# Patient Record
Sex: MALE | Race: WHITE | Employment: FULL TIME | URBAN - METROPOLITAN AREA
[De-identification: names, ages, dates, MRNs, and addresses within clinical notes are randomized per-mention and may not be internally consistent; named-entity substitution may affect disease eponyms.]

---

## 2024-01-31 PROCEDURE — 96375 TX/PRO/DX INJ NEW DRUG ADDON: CPT

## 2024-01-31 PROCEDURE — 96361 HYDRATE IV INFUSION ADD-ON: CPT

## 2024-01-31 PROCEDURE — 99285 EMERGENCY DEPT VISIT HI MDM: CPT

## 2024-01-31 PROCEDURE — 96365 THER/PROPH/DIAG IV INF INIT: CPT

## 2024-02-01 ENCOUNTER — APPOINTMENT (OUTPATIENT)
Dept: CT IMAGING | Age: 24
DRG: 373 | End: 2024-02-01
Payer: COMMERCIAL

## 2024-02-01 ENCOUNTER — HOSPITAL ENCOUNTER (INPATIENT)
Age: 24
LOS: 5 days | Discharge: HOME HEALTH | DRG: 373 | End: 2024-02-06
Attending: STUDENT IN AN ORGANIZED HEALTH CARE EDUCATION/TRAINING PROGRAM | Admitting: SURGERY
Payer: COMMERCIAL

## 2024-02-01 DIAGNOSIS — K35.32 PERFORATED APPENDICITIS: ICD-10-CM

## 2024-02-01 DIAGNOSIS — K35.32 ACUTE PERFORATED APPENDICITIS: Primary | ICD-10-CM

## 2024-02-01 LAB
ALBUMIN SERPL-MCNC: 4.7 G/DL (ref 3.5–5.2)
ALP SERPL-CCNC: 89 U/L (ref 40–129)
ALT SERPL-CCNC: 17 U/L (ref 0–40)
ANION GAP SERPL CALCULATED.3IONS-SCNC: 10 MMOL/L (ref 7–16)
ANION GAP SERPL CALCULATED.3IONS-SCNC: 11 MMOL/L (ref 7–16)
ANION GAP SERPL CALCULATED.3IONS-SCNC: 15 MMOL/L (ref 7–16)
AST SERPL-CCNC: 32 U/L (ref 0–39)
BASOPHILS # BLD: 0.03 K/UL (ref 0–0.2)
BASOPHILS # BLD: 0.03 K/UL (ref 0–0.2)
BASOPHILS NFR BLD: 0 % (ref 0–2)
BASOPHILS NFR BLD: 0 % (ref 0–2)
BILIRUB SERPL-MCNC: 1.1 MG/DL (ref 0–1.2)
BILIRUB UR QL STRIP: NEGATIVE
BUN SERPL-MCNC: 14 MG/DL (ref 6–20)
BUN SERPL-MCNC: 14 MG/DL (ref 6–20)
BUN SERPL-MCNC: 15 MG/DL (ref 6–20)
CALCIUM SERPL-MCNC: 8.6 MG/DL (ref 8.6–10.2)
CALCIUM SERPL-MCNC: 8.7 MG/DL (ref 8.6–10.2)
CALCIUM SERPL-MCNC: 9.3 MG/DL (ref 8.6–10.2)
CHLORIDE SERPL-SCNC: 100 MMOL/L (ref 98–107)
CHLORIDE SERPL-SCNC: 101 MMOL/L (ref 98–107)
CHLORIDE SERPL-SCNC: 98 MMOL/L (ref 98–107)
CLARITY UR: CLEAR
CO2 SERPL-SCNC: 23 MMOL/L (ref 22–29)
CO2 SERPL-SCNC: 26 MMOL/L (ref 22–29)
CO2 SERPL-SCNC: 26 MMOL/L (ref 22–29)
COLOR UR: YELLOW
CREAT SERPL-MCNC: 1 MG/DL (ref 0.7–1.2)
CREAT SERPL-MCNC: 1 MG/DL (ref 0.7–1.2)
CREAT SERPL-MCNC: 1.1 MG/DL (ref 0.7–1.2)
EOSINOPHIL # BLD: 0 K/UL (ref 0.05–0.5)
EOSINOPHIL # BLD: 0.03 K/UL (ref 0.05–0.5)
EOSINOPHILS RELATIVE PERCENT: 0 % (ref 0–6)
EOSINOPHILS RELATIVE PERCENT: 0 % (ref 0–6)
ERYTHROCYTE [DISTWIDTH] IN BLOOD BY AUTOMATED COUNT: 11.9 % (ref 11.5–15)
ERYTHROCYTE [DISTWIDTH] IN BLOOD BY AUTOMATED COUNT: 12.2 % (ref 11.5–15)
GFR SERPL CREATININE-BSD FRML MDRD: >60 ML/MIN/1.73M2
GLUCOSE SERPL-MCNC: 101 MG/DL (ref 74–99)
GLUCOSE SERPL-MCNC: 119 MG/DL (ref 74–99)
GLUCOSE SERPL-MCNC: 92 MG/DL (ref 74–99)
GLUCOSE UR STRIP-MCNC: NEGATIVE MG/DL
HCT VFR BLD AUTO: 44.9 % (ref 37–54)
HCT VFR BLD AUTO: 47.1 % (ref 37–54)
HGB BLD-MCNC: 15.5 G/DL (ref 12.5–16.5)
HGB BLD-MCNC: 17.1 G/DL (ref 12.5–16.5)
HGB UR QL STRIP.AUTO: ABNORMAL
IMM GRANULOCYTES # BLD AUTO: 0.04 K/UL (ref 0–0.58)
IMM GRANULOCYTES # BLD AUTO: 0.08 K/UL (ref 0–0.58)
IMM GRANULOCYTES NFR BLD: 0 % (ref 0–5)
IMM GRANULOCYTES NFR BLD: 1 % (ref 0–5)
KETONES UR STRIP-MCNC: 40 MG/DL
LACTATE BLDV-SCNC: 0.8 MMOL/L (ref 0.5–2.2)
LEUKOCYTE ESTERASE UR QL STRIP: NEGATIVE
LIPASE SERPL-CCNC: 27 U/L (ref 13–60)
LYMPHOCYTES NFR BLD: 0.78 K/UL (ref 1.5–4)
LYMPHOCYTES NFR BLD: 0.98 K/UL (ref 1.5–4)
LYMPHOCYTES RELATIVE PERCENT: 10 % (ref 20–42)
LYMPHOCYTES RELATIVE PERCENT: 5 % (ref 20–42)
MCH RBC QN AUTO: 31.2 PG (ref 26–35)
MCH RBC QN AUTO: 31.9 PG (ref 26–35)
MCHC RBC AUTO-ENTMCNC: 34.5 G/DL (ref 32–34.5)
MCHC RBC AUTO-ENTMCNC: 36.3 G/DL (ref 32–34.5)
MCV RBC AUTO: 87.9 FL (ref 80–99.9)
MCV RBC AUTO: 90.3 FL (ref 80–99.9)
MONOCYTES NFR BLD: 0.46 K/UL (ref 0.1–0.95)
MONOCYTES NFR BLD: 1.21 K/UL (ref 0.1–0.95)
MONOCYTES NFR BLD: 5 % (ref 2–12)
MONOCYTES NFR BLD: 8 % (ref 2–12)
NEUTROPHILS NFR BLD: 85 % (ref 43–80)
NEUTROPHILS NFR BLD: 86 % (ref 43–80)
NEUTS SEG NFR BLD: 12.61 K/UL (ref 1.8–7.3)
NEUTS SEG NFR BLD: 8.41 K/UL (ref 1.8–7.3)
NITRITE UR QL STRIP: NEGATIVE
PH UR STRIP: 6 [PH] (ref 5–9)
PLATELET # BLD AUTO: 155 K/UL (ref 130–450)
PLATELET # BLD AUTO: 185 K/UL (ref 130–450)
PMV BLD AUTO: 10 FL (ref 7–12)
PMV BLD AUTO: 10.4 FL (ref 7–12)
POTASSIUM SERPL-SCNC: 4 MMOL/L (ref 3.5–5)
POTASSIUM SERPL-SCNC: 4.1 MMOL/L (ref 3.5–5)
POTASSIUM SERPL-SCNC: 4.2 MMOL/L (ref 3.5–5)
PROT SERPL-MCNC: 7.9 G/DL (ref 6.4–8.3)
PROT UR STRIP-MCNC: ABNORMAL MG/DL
RBC # BLD AUTO: 4.97 M/UL (ref 3.8–5.8)
RBC # BLD AUTO: 5.36 M/UL (ref 3.8–5.8)
RBC #/AREA URNS HPF: ABNORMAL /HPF
SODIUM SERPL-SCNC: 136 MMOL/L (ref 132–146)
SODIUM SERPL-SCNC: 136 MMOL/L (ref 132–146)
SODIUM SERPL-SCNC: 138 MMOL/L (ref 132–146)
SP GR UR STRIP: 1.02 (ref 1–1.03)
UROBILINOGEN UR STRIP-ACNC: 0.2 EU/DL (ref 0–1)
WBC #/AREA URNS HPF: ABNORMAL /HPF
WBC OTHER # BLD: 10 K/UL (ref 4.5–11.5)
WBC OTHER # BLD: 14.7 K/UL (ref 4.5–11.5)

## 2024-02-01 PROCEDURE — 85025 COMPLETE CBC W/AUTO DIFF WBC: CPT

## 2024-02-01 PROCEDURE — 83690 ASSAY OF LIPASE: CPT

## 2024-02-01 PROCEDURE — 74177 CT ABD & PELVIS W/CONTRAST: CPT

## 2024-02-01 PROCEDURE — 6360000002 HC RX W HCPCS: Performed by: SURGERY

## 2024-02-01 PROCEDURE — 1200000000 HC SEMI PRIVATE

## 2024-02-01 PROCEDURE — 2580000003 HC RX 258: Performed by: STUDENT IN AN ORGANIZED HEALTH CARE EDUCATION/TRAINING PROGRAM

## 2024-02-01 PROCEDURE — 81001 URINALYSIS AUTO W/SCOPE: CPT

## 2024-02-01 PROCEDURE — 80053 COMPREHEN METABOLIC PANEL: CPT

## 2024-02-01 PROCEDURE — 6360000002 HC RX W HCPCS: Performed by: STUDENT IN AN ORGANIZED HEALTH CARE EDUCATION/TRAINING PROGRAM

## 2024-02-01 PROCEDURE — 2580000003 HC RX 258

## 2024-02-01 PROCEDURE — 6370000000 HC RX 637 (ALT 250 FOR IP): Performed by: STUDENT IN AN ORGANIZED HEALTH CARE EDUCATION/TRAINING PROGRAM

## 2024-02-01 PROCEDURE — 6360000004 HC RX CONTRAST MEDICATION: Performed by: RADIOLOGY

## 2024-02-01 PROCEDURE — 2580000003 HC RX 258: Performed by: SURGERY

## 2024-02-01 PROCEDURE — 83605 ASSAY OF LACTIC ACID: CPT

## 2024-02-01 PROCEDURE — 80048 BASIC METABOLIC PNL TOTAL CA: CPT

## 2024-02-01 RX ORDER — ENOXAPARIN SODIUM 100 MG/ML
40 INJECTION SUBCUTANEOUS DAILY
Status: DISCONTINUED | OUTPATIENT
Start: 2024-02-01 | End: 2024-02-06 | Stop reason: HOSPADM

## 2024-02-01 RX ORDER — OXYCODONE HYDROCHLORIDE 5 MG/1
10 TABLET ORAL EVERY 4 HOURS PRN
Status: DISCONTINUED | OUTPATIENT
Start: 2024-02-01 | End: 2024-02-06 | Stop reason: HOSPADM

## 2024-02-01 RX ORDER — METHOCARBAMOL 500 MG/1
1000 TABLET, FILM COATED ORAL 4 TIMES DAILY
Status: DISCONTINUED | OUTPATIENT
Start: 2024-02-01 | End: 2024-02-06 | Stop reason: HOSPADM

## 2024-02-01 RX ORDER — SODIUM CHLORIDE, SODIUM LACTATE, POTASSIUM CHLORIDE, AND CALCIUM CHLORIDE .6; .31; .03; .02 G/100ML; G/100ML; G/100ML; G/100ML
1000 INJECTION, SOLUTION INTRAVENOUS ONCE
Status: COMPLETED | OUTPATIENT
Start: 2024-02-01 | End: 2024-02-01

## 2024-02-01 RX ORDER — SODIUM CHLORIDE 9 MG/ML
INJECTION, SOLUTION INTRAVENOUS PRN
Status: DISCONTINUED | OUTPATIENT
Start: 2024-02-01 | End: 2024-02-06 | Stop reason: HOSPADM

## 2024-02-01 RX ORDER — 0.9 % SODIUM CHLORIDE 0.9 %
1000 INTRAVENOUS SOLUTION INTRAVENOUS ONCE
Status: COMPLETED | OUTPATIENT
Start: 2024-02-01 | End: 2024-02-01

## 2024-02-01 RX ORDER — KETOROLAC TROMETHAMINE 30 MG/ML
15 INJECTION, SOLUTION INTRAMUSCULAR; INTRAVENOUS ONCE
Status: COMPLETED | OUTPATIENT
Start: 2024-02-01 | End: 2024-02-01

## 2024-02-01 RX ORDER — ONDANSETRON 2 MG/ML
4 INJECTION INTRAMUSCULAR; INTRAVENOUS EVERY 6 HOURS PRN
Status: DISCONTINUED | OUTPATIENT
Start: 2024-02-01 | End: 2024-02-06 | Stop reason: HOSPADM

## 2024-02-01 RX ORDER — ONDANSETRON 4 MG/1
4 TABLET, ORALLY DISINTEGRATING ORAL EVERY 8 HOURS PRN
Status: DISCONTINUED | OUTPATIENT
Start: 2024-02-01 | End: 2024-02-06 | Stop reason: HOSPADM

## 2024-02-01 RX ORDER — SODIUM CHLORIDE 0.9 % (FLUSH) 0.9 %
5-40 SYRINGE (ML) INJECTION PRN
Status: DISCONTINUED | OUTPATIENT
Start: 2024-02-01 | End: 2024-02-06 | Stop reason: HOSPADM

## 2024-02-01 RX ORDER — ACETAMINOPHEN 325 MG/1
650 TABLET ORAL EVERY 6 HOURS
Status: DISCONTINUED | OUTPATIENT
Start: 2024-02-01 | End: 2024-02-06 | Stop reason: HOSPADM

## 2024-02-01 RX ORDER — SODIUM CHLORIDE 0.9 % (FLUSH) 0.9 %
5-40 SYRINGE (ML) INJECTION EVERY 12 HOURS SCHEDULED
Status: DISCONTINUED | OUTPATIENT
Start: 2024-02-01 | End: 2024-02-06 | Stop reason: HOSPADM

## 2024-02-01 RX ORDER — SODIUM CHLORIDE, SODIUM LACTATE, POTASSIUM CHLORIDE, CALCIUM CHLORIDE 600; 310; 30; 20 MG/100ML; MG/100ML; MG/100ML; MG/100ML
INJECTION, SOLUTION INTRAVENOUS CONTINUOUS
Status: DISCONTINUED | OUTPATIENT
Start: 2024-02-01 | End: 2024-02-03

## 2024-02-01 RX ORDER — FENTANYL CITRATE 50 UG/ML
50 INJECTION, SOLUTION INTRAMUSCULAR; INTRAVENOUS ONCE
Status: COMPLETED | OUTPATIENT
Start: 2024-02-01 | End: 2024-02-01

## 2024-02-01 RX ORDER — OXYCODONE HYDROCHLORIDE 5 MG/1
5 TABLET ORAL EVERY 4 HOURS PRN
Status: DISCONTINUED | OUTPATIENT
Start: 2024-02-01 | End: 2024-02-06 | Stop reason: HOSPADM

## 2024-02-01 RX ORDER — ONDANSETRON 2 MG/ML
4 INJECTION INTRAMUSCULAR; INTRAVENOUS ONCE
Status: COMPLETED | OUTPATIENT
Start: 2024-02-01 | End: 2024-02-01

## 2024-02-01 RX ADMIN — ENOXAPARIN SODIUM 40 MG: 100 INJECTION SUBCUTANEOUS at 09:45

## 2024-02-01 RX ADMIN — FENTANYL CITRATE 50 MCG: 50 INJECTION, SOLUTION INTRAMUSCULAR; INTRAVENOUS at 02:41

## 2024-02-01 RX ADMIN — METHOCARBAMOL TABLETS 1000 MG: 500 TABLET, COATED ORAL at 21:17

## 2024-02-01 RX ADMIN — OXYCODONE 5 MG: 5 TABLET ORAL at 09:43

## 2024-02-01 RX ADMIN — IOPAMIDOL 75 ML: 755 INJECTION, SOLUTION INTRAVENOUS at 01:57

## 2024-02-01 RX ADMIN — PIPERACILLIN SODIUM AND TAZOBACTAM SODIUM 4500 MG: 4; .5 INJECTION, POWDER, LYOPHILIZED, FOR SOLUTION INTRAVENOUS at 02:39

## 2024-02-01 RX ADMIN — METHOCARBAMOL TABLETS 1000 MG: 500 TABLET, COATED ORAL at 16:56

## 2024-02-01 RX ADMIN — ONDANSETRON 4 MG: 2 INJECTION INTRAMUSCULAR; INTRAVENOUS at 02:07

## 2024-02-01 RX ADMIN — METHOCARBAMOL TABLETS 1000 MG: 500 TABLET, COATED ORAL at 11:52

## 2024-02-01 RX ADMIN — ACETAMINOPHEN 650 MG: 325 TABLET ORAL at 11:52

## 2024-02-01 RX ADMIN — SODIUM CHLORIDE 1000 ML: 9 INJECTION, SOLUTION INTRAVENOUS at 00:56

## 2024-02-01 RX ADMIN — METHOCARBAMOL TABLETS 1000 MG: 500 TABLET, COATED ORAL at 09:43

## 2024-02-01 RX ADMIN — ACETAMINOPHEN 650 MG: 325 TABLET ORAL at 19:00

## 2024-02-01 RX ADMIN — AMPICILLIN SODIUM AND SULBACTAM SODIUM 3000 MG: 2; 1 INJECTION, POWDER, FOR SOLUTION INTRAMUSCULAR; INTRAVENOUS at 21:21

## 2024-02-01 RX ADMIN — OXYCODONE 5 MG: 5 TABLET ORAL at 16:56

## 2024-02-01 RX ADMIN — AMPICILLIN SODIUM AND SULBACTAM SODIUM 3000 MG: 2; 1 INJECTION, POWDER, FOR SOLUTION INTRAMUSCULAR; INTRAVENOUS at 13:48

## 2024-02-01 RX ADMIN — SODIUM CHLORIDE, POTASSIUM CHLORIDE, SODIUM LACTATE AND CALCIUM CHLORIDE 1000 ML: 600; 310; 30; 20 INJECTION, SOLUTION INTRAVENOUS at 05:09

## 2024-02-01 RX ADMIN — AMPICILLIN SODIUM AND SULBACTAM SODIUM 3000 MG: 2; 1 INJECTION, POWDER, FOR SOLUTION INTRAMUSCULAR; INTRAVENOUS at 07:57

## 2024-02-01 RX ADMIN — OXYCODONE 5 MG: 5 TABLET ORAL at 05:46

## 2024-02-01 RX ADMIN — SODIUM CHLORIDE, POTASSIUM CHLORIDE, SODIUM LACTATE AND CALCIUM CHLORIDE: 600; 310; 30; 20 INJECTION, SOLUTION INTRAVENOUS at 06:14

## 2024-02-01 RX ADMIN — ACETAMINOPHEN 650 MG: 325 TABLET ORAL at 06:16

## 2024-02-01 RX ADMIN — KETOROLAC TROMETHAMINE 15 MG: 30 INJECTION, SOLUTION INTRAMUSCULAR; INTRAVENOUS at 02:07

## 2024-02-01 ASSESSMENT — PAIN DESCRIPTION - ORIENTATION
ORIENTATION: RIGHT
ORIENTATION: LEFT;MID
ORIENTATION: RIGHT;LOWER
ORIENTATION: LOWER;RIGHT
ORIENTATION: RIGHT
ORIENTATION: RIGHT
ORIENTATION: LOWER;RIGHT

## 2024-02-01 ASSESSMENT — PAIN DESCRIPTION - LOCATION
LOCATION: ABDOMEN

## 2024-02-01 ASSESSMENT — PAIN SCALES - GENERAL
PAINLEVEL_OUTOF10: 6
PAINLEVEL_OUTOF10: 4
PAINLEVEL_OUTOF10: 6
PAINLEVEL_OUTOF10: 6
PAINLEVEL_OUTOF10: 1
PAINLEVEL_OUTOF10: 5
PAINLEVEL_OUTOF10: 0
PAINLEVEL_OUTOF10: 10
PAINLEVEL_OUTOF10: 2
PAINLEVEL_OUTOF10: 3
PAINLEVEL_OUTOF10: 0
PAINLEVEL_OUTOF10: 4
PAINLEVEL_OUTOF10: 3

## 2024-02-01 ASSESSMENT — PAIN DESCRIPTION - ONSET: ONSET: ON-GOING

## 2024-02-01 ASSESSMENT — PAIN DESCRIPTION - DESCRIPTORS
DESCRIPTORS: ACHING;DISCOMFORT;SORE
DESCRIPTORS: DULL;ACHING
DESCRIPTORS: DISCOMFORT;SORE
DESCRIPTORS: DISCOMFORT;ACHING
DESCRIPTORS: ACHING;DISCOMFORT;SORE;THROBBING
DESCRIPTORS: SPASM;THROBBING;TIGHTNESS
DESCRIPTORS: ACHING;DISCOMFORT;SORE
DESCRIPTORS: SHARP;STABBING

## 2024-02-01 ASSESSMENT — PAIN DESCRIPTION - PAIN TYPE
TYPE: ACUTE PAIN

## 2024-02-01 ASSESSMENT — PAIN - FUNCTIONAL ASSESSMENT
PAIN_FUNCTIONAL_ASSESSMENT: ACTIVITIES ARE NOT PREVENTED
PAIN_FUNCTIONAL_ASSESSMENT: 0-10
PAIN_FUNCTIONAL_ASSESSMENT: PREVENTS OR INTERFERES SOME ACTIVE ACTIVITIES AND ADLS
PAIN_FUNCTIONAL_ASSESSMENT: ACTIVITIES ARE NOT PREVENTED

## 2024-02-01 ASSESSMENT — LIFESTYLE VARIABLES: HOW OFTEN DO YOU HAVE A DRINK CONTAINING ALCOHOL: NEVER

## 2024-02-01 ASSESSMENT — PAIN DESCRIPTION - FREQUENCY: FREQUENCY: CONTINUOUS

## 2024-02-01 NOTE — ED NOTES
Department of Emergency Medicine  FIRST PROVIDER TRIAGE NOTE             Independent MLP           2/1/24  12:07 AM EST    Date of Encounter: 2/1/24   MRN: [id number]      HPI: Mark Malave is a 23 y.o. male who presents to the ED for Abdominal Pain (Mid abd pain that radiates down to RLQ x 2 days, symptoms began Tuesday morning.), Diarrhea, and Fever (100.8 F at home today )  Patient states that he began to have right lower quadrant abdominal pain which began 2 days ago which has been worsening.  History radiates to the bellybutton.  Patient did have hernia repair in December.  He had a fever 100.8 at home.  Also had diarrhea.  States he has had nausea but denies any vomiting    ROS: Negative for cp or sob.    PE: Gen Appearance/Constitutional: alert  CV: regular rate     Initial Plan of Care: All treatment areas with department are currently occupied. Plan to order/Initiate the following while awaiting opening in ED: labs and imaging studies.  Initiate Treatment-Testing, Proceed toTreatment Area When Bed Available for ED Attending/MLP to Continue Care    Electronically signed by GILLIAN Rodgers CNP   DD: 2/1/24       Abigail Mota APRN - CNP  02/01/24 0008

## 2024-02-01 NOTE — PLAN OF CARE
Problem: Discharge Planning  Goal: Discharge to home or other facility with appropriate resources  2/1/2024 1236 by Abigail Aguiar RN  Outcome: Progressing  2/1/2024 0426 by Licha Reyes RN  Outcome: Progressing  Flowsheets  Taken 2/1/2024 0420  Discharge to home or other facility with appropriate resources: Identify barriers to discharge with patient and caregiver  Taken 2/1/2024 0417  Discharge to home or other facility with appropriate resources: Identify barriers to discharge with patient and caregiver     Problem: Pain  Goal: Verbalizes/displays adequate comfort level or baseline comfort level  2/1/2024 1236 by Abigail Aguiar RN  Outcome: Progressing  2/1/2024 0426 by Licha Reyes RN  Outcome: Progressing     Problem: Safety - Adult  Goal: Free from fall injury  2/1/2024 1236 by Abigail Aguiar RN  Outcome: Progressing  2/1/2024 0426 by Licha Reyes RN  Outcome: Progressing     Problem: ABCDS Injury Assessment  Goal: Absence of physical injury  Outcome: Progressing

## 2024-02-01 NOTE — PLAN OF CARE
Problem: Discharge Planning  Goal: Discharge to home or other facility with appropriate resources  Outcome: Progressing  Flowsheets  Taken 2/1/2024 0420  Discharge to home or other facility with appropriate resources: Identify barriers to discharge with patient and caregiver  Taken 2/1/2024 041  Discharge to home or other facility with appropriate resources: Identify barriers to discharge with patient and caregiver     Problem: Pain  Goal: Verbalizes/displays adequate comfort level or baseline comfort level  Outcome: Progressing     Problem: Safety - Adult  Goal: Free from fall injury  Outcome: Progressing

## 2024-02-01 NOTE — ED NOTES
ED to Inpatient Handoff Report    Notified [facility] electronic handoff available and patient ready for transport to room 745.    Safety Risks: None identified    Patient in Restraints: no    Constant Observer or Patient : no    Telemetry Monitoring Ordered :yes           Order to transfer to unit without monitor:YES    Last MEWS:  Time completed: 0330    Deterioration Index Score:   Predictive Model Details          16 (Normal)  Factor Value    Calculated 2/1/2024 03:37 30% WBC count abnormal (14.7 k/uL)    Deterioration Index Model 27% Pulse 104     14% Age 23 years old     11% Potassium 4.2 mmol/L     6% Systolic 120     5% Sodium 136 mmol/L     5% Pulse oximetry 99 %     1% Hematocrit 47.1 %     1% Temperature 98.8 °F (37.1 °C)     0% Respiratory rate 16        Vitals:    02/01/24 0003 02/01/24 0241 02/01/24 0243 02/01/24 0330   BP: 118/77   120/73   Pulse: (!) 118  (!) 115 (!) 104   Resp: 18 16  16   Temp: 100 °F (37.8 °C)   98.8 °F (37.1 °C)   TempSrc: Oral   Oral   SpO2: 97%   99%   Weight: 79.4 kg (175 lb)      Height: 1.803 m (5' 11\")            Opportunity for questions and clarification was provided.

## 2024-02-01 NOTE — DISCHARGE SUMMARY
Physician Discharge Summary     Patient ID:  Sandeep Malave  [id number]  23 y.o.  2000    Admit date: 2/1/2024    Discharge date and time: 2/6/2024    Admitting Physician: Mark Salinas MD     Admission Diagnoses: Acute perforated appendicitis [K35.32]  Perforated appendicitis [K35.32]    Discharge Diagnoses: Principal Problem:    Perforated appendicitis  Resolved Problems:    * No resolved hospital problems. *      Admission Condition: fair    Discharged Condition: stable      Hospital Course:  Sandeep Malave is a 23 y.o. male who presented with right lower quadrant abdominal pain, nausea, and fever. Work up revealed perforated appendicitis with plans to treat conservatively. The patient's course was otherwise uneventful. He progressed well, pain was controlled on PO medications. He was tolerating a regular diet with no nausea or vomiting, and was in a suitable condition for discharge to home with PICC in place for IV abx in stable condition.      Consults:   IP CONSULT TO GENERAL SURGERY    Significant Diagnostic Studies:   CT ABDOMEN PELVIS W IV CONTRAST Additional Contrast? None    Result Date: 2/1/2024  EXAMINATION: CT OF THE ABDOMEN AND PELVIS WITH CONTRAST 2/1/2024 1:50 am TECHNIQUE: CT of the abdomen and pelvis was performed with the administration of intravenous contrast. Multiplanar reformatted images are provided for review. Automated exposure control, iterative reconstruction, and/or weight based adjustment of the mA/kV was utilized to reduce the radiation dose to as low as reasonably achievable. COMPARISON: None. HISTORY: ORDERING SYSTEM PROVIDED HISTORY: rlq ABDOMINAL PAIN TECHNOLOGIST PROVIDED HISTORY: Reason for exam:->rlq ABDOMINAL PAIN Additional Contrast?->None Decision Support Exception - unselect if not a suspected or confirmed emergency medical condition->Emergency Medical Condition (MA) FINDINGS: Lower Chest:  Visualized portion of the lower chest demonstrates no acute

## 2024-02-01 NOTE — PROGRESS NOTES
4 Eyes Skin Assessment     NAME:  Sandeep Malave  YOB: 2000  MEDICAL RECORD NUMBER:  [id number]    The patient is being assessed for  Admission    I agree that at least one RN has performed a thorough Head to Toe Skin Assessment on the patient. ALL assessment sites listed below have been assessed.      Areas assessed by both nurses:    Head, Face, Ears, Shoulders, Back, Chest, Arms, Elbows, Hands, Sacrum. Buttock, Coccyx, Ischium, and Legs. Feet and Heels        Does the Patient have a Wound? No noted wound(s)       Pavan Prevention initiated by RN: No  Wound Care Orders initiated by RN: No    Pressure Injury (Stage 3,4, Unstageable, DTI, NWPT, and Complex wounds) if present, place Wound referral order by RN under : No    New Ostomies, if present place, Ostomy referral order under : No     Nurse 1 eSignature: Electronically signed by Cydney Rivas RN on 2/1/24 at 8:01 AM EST    **SHARE this note so that the co-signing nurse can place an eSignature**    Nurse 2 eSignature: {Esignature:[id number]}

## 2024-02-01 NOTE — PROGRESS NOTES
P Quality Flow/Interdisciplinary Rounds Progress Note        Quality Flow Rounds held on February 1, 2024    Disciplines Attending:  Bedside Nurse, , , and Nursing Unit Leadership    Sandeep Malave was admitted on 2/1/2024  1:24 AM    Anticipated Discharge Date:       Disposition:    Pavan Score:  Pavan Scale Score: 21    Readmission Risk              Risk of Unplanned Readmission:  6           Discussed patient goal for the day, patient clinical progression, and barriers to discharge.  The following Goal(s) of the Day/Commitment(s) have been identified:   Discharge planning      Kaitlyn Celestin RN  February 1, 2024

## 2024-02-01 NOTE — H&P
GENERAL SURGERY  HISTORY AND PHYSICAL  2/1/2024    Chief Complaint   Patient presents with    Abdominal Pain     Mid abd pain that radiates down to RLQ x 2 days, symptoms began Tuesday morning.    Diarrhea    Fever     100.8 F at home today        HPI  Sandeep Malave is a 23 y.o. male who presents for evaluation of abdominal pain. Patient has had RLQ pain constant since Tuesday morning as well as diarrhea and some low grade fevers. Admits to nausea, but no emesis.     Hx notable for inguinal hernia repair by Dr. Montana 12/23.     Febrile, mildly tachycardic on arrival. WBC 14. CT A/P showing dilated appendix with appendicolith and adjacent fluid and extraluminal gas.     History reviewed. No pertinent past medical history.    Past Surgical History:   Procedure Laterality Date    HERNIA REPAIR         Prior to Admission medications    Not on File       Allergies   Allergen Reactions    Latex Hives       History reviewed. No pertinent family history.    Social History     Tobacco Use    Smoking status: Never    Smokeless tobacco: Never   Vaping Use    Vaping Use: Never used   Substance Use Topics    Alcohol use: Yes    Drug use: Never       Review of Systems: pertinent ROS listed in HPI, all others negative     PHYSICAL EXAM:    Vitals:    02/01/24 0243   BP:    Pulse: (!) 115   Resp:    Temp:    SpO2:        GENERAL:  NAD. A&Ox3.  HEAD:  Normocephalic, atraumatic.   EYES:   No scleral icterus. PERRLA.  LUNGS:  No increased work of breathing.  CARDIOVASCULAR: RR  ABDOMEN:  Soft, non-distended, mildly tender to palpation RLQ. No guarding, rigidity, rebound. Non-peritoneal  EXT: warm and well perfused       ASSESSMENT/PLAN:  23 y.o. male with perforated appendicitis. Will plan for conservative management as patient is not peritoneal and there is no drainable fluid collection right now.     NPO, IVF, IV abx  Will repeat CT AP in several days to reassess for drainable abscess    Plan will be discussed with

## 2024-02-01 NOTE — ED PROVIDER NOTES
[facility] MED SURG  EMERGENCY DEPARTMENT ENCOUNTER        Pt Name: Sandeep Malave  MRN: [id number]  Birthdate 2000  Date of evaluation: 1/31/2024  Provider: Danika Ellison DO  PCP: Beba Cooper MD  Note Started: 1:44 AM EST 2/1/24    CHIEF COMPLAINT       Chief Complaint   Patient presents with    Abdominal Pain     Mid abd pain that radiates down to RLQ x 2 days, symptoms began Tuesday morning.    Diarrhea    Fever     100.8 F at home today        HISTORY OF PRESENT ILLNESS: 1 or more Elements   History From: patient    Limitations to history : None    Sandeep Malave is a 23 y.o. male who presents to the emergency department complaining of abdominal pain.  Patient has been acting right lower quadrant abdominal pain over the past 2 days.  States that Tuesday morning he had pain in his periumbilical region and it has migrated down to his right lower quadrant.  He has had some nausea and episodes of diarrhea as well.  Patient states he also had a fever of 100.8 at home earlier today.  He was taking ibuprofen at home and his last dose was around 11 PM this evening.  He does have a history of a hernia repair in the past that was performed by Dr. Montana in December.  He states he does not have a history of any other prior abdominal surgeries.  Patient does not take any medications at home or have any medical problems in the past otherwise.  He denies any vomiting, chest pain, shortness of breath, dysuria, hematuria, recent hospitalization, recent illness, or other acute symptoms or concerns.    Nursing Notes were all reviewed and agreed with or any disagreements were addressed in the HPI.    REVIEW OF SYSTEMS :      Review of Systems    Positives and Pertinent negatives as per HPI.     SURGICAL HISTORY     Past Surgical History:   Procedure Laterality Date    HERNIA REPAIR         CURRENTMEDICATIONS       There are no discharge medications for this patient.      ALLERGIES    02/01/24 0546 02/01/24 0713   BP: 120/73 117/76  (!) 104/59   Pulse: (!) 104 (!) 119  (!) 114   Resp: 16 18 16 16   Temp: 98.8 °F (37.1 °C) (!) 100.5 °F (38.1 °C)  99.2 °F (37.3 °C)   TempSrc: Oral Oral  Oral   SpO2: 99% 98%  96%   Weight:       Height:           Patient was given the following medications:  Medications   sodium chloride flush 0.9 % injection 5-40 mL (has no administration in time range)   sodium chloride flush 0.9 % injection 5-40 mL (has no administration in time range)   0.9 % sodium chloride infusion (has no administration in time range)   ondansetron (ZOFRAN-ODT) disintegrating tablet 4 mg (has no administration in time range)     Or   ondansetron (ZOFRAN) injection 4 mg (has no administration in time range)   enoxaparin (LOVENOX) injection 40 mg (has no administration in time range)   lactated ringers IV soln infusion ( IntraVENous New Bag 2/1/24 0614)   acetaminophen (TYLENOL) tablet 650 mg (650 mg Oral Given 2/1/24 0616)   methocarbamol (ROBAXIN) tablet 1,000 mg (has no administration in time range)   oxyCODONE (ROXICODONE) immediate release tablet 5 mg (5 mg Oral Given 2/1/24 0546)     Or   oxyCODONE (ROXICODONE) immediate release tablet 10 mg ( Oral See Alternative 2/1/24 0546)   HYDROmorphone (DILAUDID) injection 0.5 mg (has no administration in time range)   ampicillin-sulbactam (UNASYN) 3,000 mg in sodium chloride 0.9 % 100 mL IVPB (3,000 mg IntraVENous New Bag 2/1/24 0757)   sodium chloride 0.9 % bolus 1,000 mL (0 mLs IntraVENous Stopped 2/1/24 0216)   iopamidol (ISOVUE-370) 76 % injection 75 mL (75 mLs IntraVENous Given 2/1/24 0157)   ondansetron (ZOFRAN) injection 4 mg (4 mg IntraVENous Given 2/1/24 0207)   ketorolac (TORADOL) injection 15 mg (15 mg IntraVENous Given 2/1/24 0207)   piperacillin-tazobactam (ZOSYN) 4,500 mg in sodium chloride 0.9 % 100 mL IVPB (vial-mate) (0 mg IntraVENous Stopped 2/1/24 0332)   fentaNYL (SUBLIMAZE) injection 50 mcg (50 mcg IntraVENous Given 2/1/24

## 2024-02-02 LAB
ANION GAP SERPL CALCULATED.3IONS-SCNC: 11 MMOL/L (ref 7–16)
BASOPHILS # BLD: 0.01 K/UL (ref 0–0.2)
BASOPHILS NFR BLD: 0 % (ref 0–2)
BUN SERPL-MCNC: 9 MG/DL (ref 6–20)
CALCIUM SERPL-MCNC: 8.1 MG/DL (ref 8.6–10.2)
CHLORIDE SERPL-SCNC: 100 MMOL/L (ref 98–107)
CO2 SERPL-SCNC: 24 MMOL/L (ref 22–29)
CREAT SERPL-MCNC: 0.8 MG/DL (ref 0.7–1.2)
EOSINOPHIL # BLD: 0.01 K/UL (ref 0.05–0.5)
EOSINOPHILS RELATIVE PERCENT: 0 % (ref 0–6)
ERYTHROCYTE [DISTWIDTH] IN BLOOD BY AUTOMATED COUNT: 11.9 % (ref 11.5–15)
GFR SERPL CREATININE-BSD FRML MDRD: >60 ML/MIN/1.73M2
GLUCOSE SERPL-MCNC: 116 MG/DL (ref 74–99)
HCT VFR BLD AUTO: 37 % (ref 37–54)
HGB BLD-MCNC: 13.2 G/DL (ref 12.5–16.5)
IMM GRANULOCYTES # BLD AUTO: 0.06 K/UL (ref 0–0.58)
IMM GRANULOCYTES NFR BLD: 1 % (ref 0–5)
LYMPHOCYTES NFR BLD: 0.78 K/UL (ref 1.5–4)
LYMPHOCYTES RELATIVE PERCENT: 8 % (ref 20–42)
MCH RBC QN AUTO: 31.6 PG (ref 26–35)
MCHC RBC AUTO-ENTMCNC: 35.7 G/DL (ref 32–34.5)
MCV RBC AUTO: 88.5 FL (ref 80–99.9)
MONOCYTES NFR BLD: 0.72 K/UL (ref 0.1–0.95)
MONOCYTES NFR BLD: 8 % (ref 2–12)
NEUTROPHILS NFR BLD: 83 % (ref 43–80)
NEUTS SEG NFR BLD: 7.86 K/UL (ref 1.8–7.3)
PLATELET, FLUORESCENCE: 141 K/UL (ref 130–450)
PMV BLD AUTO: 10.7 FL (ref 7–12)
POTASSIUM SERPL-SCNC: 3.8 MMOL/L (ref 3.5–5)
RBC # BLD AUTO: 4.18 M/UL (ref 3.8–5.8)
SODIUM SERPL-SCNC: 135 MMOL/L (ref 132–146)
WBC OTHER # BLD: 9.4 K/UL (ref 4.5–11.5)

## 2024-02-02 PROCEDURE — 85025 COMPLETE CBC W/AUTO DIFF WBC: CPT

## 2024-02-02 PROCEDURE — 6360000002 HC RX W HCPCS: Performed by: STUDENT IN AN ORGANIZED HEALTH CARE EDUCATION/TRAINING PROGRAM

## 2024-02-02 PROCEDURE — 1200000000 HC SEMI PRIVATE

## 2024-02-02 PROCEDURE — 87081 CULTURE SCREEN ONLY: CPT

## 2024-02-02 PROCEDURE — 80048 BASIC METABOLIC PNL TOTAL CA: CPT

## 2024-02-02 PROCEDURE — 6360000002 HC RX W HCPCS: Performed by: SURGERY

## 2024-02-02 PROCEDURE — 2580000003 HC RX 258: Performed by: STUDENT IN AN ORGANIZED HEALTH CARE EDUCATION/TRAINING PROGRAM

## 2024-02-02 PROCEDURE — 6370000000 HC RX 637 (ALT 250 FOR IP): Performed by: STUDENT IN AN ORGANIZED HEALTH CARE EDUCATION/TRAINING PROGRAM

## 2024-02-02 PROCEDURE — 2580000003 HC RX 258: Performed by: SURGERY

## 2024-02-02 RX ADMIN — ENOXAPARIN SODIUM 40 MG: 100 INJECTION SUBCUTANEOUS at 08:16

## 2024-02-02 RX ADMIN — ACETAMINOPHEN 650 MG: 325 TABLET ORAL at 01:09

## 2024-02-02 RX ADMIN — ACETAMINOPHEN 650 MG: 325 TABLET ORAL at 18:47

## 2024-02-02 RX ADMIN — ACETAMINOPHEN 650 MG: 325 TABLET ORAL at 06:24

## 2024-02-02 RX ADMIN — PIPERACILLIN AND TAZOBACTAM 4500 MG: 4; .5 INJECTION, POWDER, FOR SOLUTION INTRAVENOUS at 17:15

## 2024-02-02 RX ADMIN — AMPICILLIN SODIUM AND SULBACTAM SODIUM 3000 MG: 2; 1 INJECTION, POWDER, FOR SOLUTION INTRAMUSCULAR; INTRAVENOUS at 03:40

## 2024-02-02 RX ADMIN — ACETAMINOPHEN 650 MG: 325 TABLET ORAL at 12:37

## 2024-02-02 RX ADMIN — METHOCARBAMOL TABLETS 1000 MG: 500 TABLET, COATED ORAL at 08:16

## 2024-02-02 RX ADMIN — PIPERACILLIN AND TAZOBACTAM 4500 MG: 4; .5 INJECTION, POWDER, FOR SOLUTION INTRAVENOUS at 08:25

## 2024-02-02 ASSESSMENT — PAIN SCALES - GENERAL
PAINLEVEL_OUTOF10: 0
PAINLEVEL_OUTOF10: 3
PAINLEVEL_OUTOF10: 0
PAINLEVEL_OUTOF10: 2
PAINLEVEL_OUTOF10: 0
PAINLEVEL_OUTOF10: 3

## 2024-02-02 ASSESSMENT — PAIN DESCRIPTION - DESCRIPTORS
DESCRIPTORS: ACHING;SORE
DESCRIPTORS: ACHING;SORE;DISCOMFORT

## 2024-02-02 ASSESSMENT — PAIN DESCRIPTION - ORIENTATION
ORIENTATION: RIGHT;LOWER
ORIENTATION: RIGHT;LOWER

## 2024-02-02 ASSESSMENT — PAIN DESCRIPTION - LOCATION
LOCATION: ABDOMEN
LOCATION: ABDOMEN

## 2024-02-02 ASSESSMENT — PAIN - FUNCTIONAL ASSESSMENT
PAIN_FUNCTIONAL_ASSESSMENT: ACTIVITIES ARE NOT PREVENTED

## 2024-02-02 ASSESSMENT — PAIN DESCRIPTION - PAIN TYPE
TYPE: ACUTE PAIN
TYPE: ACUTE PAIN

## 2024-02-02 NOTE — PROGRESS NOTES
P Quality Flow/Interdisciplinary Rounds Progress Note        Quality Flow Rounds held on February 2, 2024    Disciplines Attending:  Bedside Nurse, , , and Nursing Unit Leadership    Sandeep Malave was admitted on 2/1/2024  1:24 AM    Anticipated Discharge Date:       Disposition:    Pavan Score:  Pavan Scale Score: 19    Readmission Risk              Risk of Unplanned Readmission:  9           Discussed patient goal for the day, patient clinical progression, and barriers to discharge.  The following Goal(s) of the Day/Commitment(s) have been identified:   Discharge planning      Kaitlyn Celestin RN  February 2, 2024

## 2024-02-02 NOTE — PROGRESS NOTES
GENERAL SURGERY  DAILY PROGRESS NOTE  2/2/2024    CHIEF COMPLAINT:  Chief Complaint   Patient presents with    Abdominal Pain     Mid abd pain that radiates down to RLQ x 2 days, symptoms began Tuesday morning.    Diarrhea    Fever     100.8 F at home today        SUBJECTIVE:  Patient febrile overnight. Abdominal pain improved overnight, passing gas. Had BM. Would like to stay with CLD, poor appetite.     OBJECTIVE:  /63   Pulse (!) 111   Temp 99.1 °F (37.3 °C) (Oral)   Resp 16   Ht 1.803 m (5' 11\")   Wt 79.4 kg (175 lb)   SpO2 92%   BMI 24.41 kg/m²     GENERAL:  NAD. A&Ox3.  LUNGS:  No increased work of breathing.  CARDIOVASCULAR: RR  ABDOMEN:  Soft, non-distended, non-tender. No guarding, rigidity, rebound.  EXT: warm and well perfused    ASSESSMENT/PLAN:  23 y.o. male with perforated appendicitis, planning for conservative management.     - continue CLD + mIVF  - continue IV antibiotics  - will plan for repeat CT A/P in a few days to reassess for drainable abscess    Discussed with Dr. Salinas.     Michelle Bright MD  Surgery Resident PGY-3  2/2/2024  4:55 AM    The patient was seen and examined and the chart was reviewed.  The patient had a perforated appendicitis with focal peritonitis.   Tmax 101.8  Slight tachycardia  WBCs 9.4  Physical examination:  The abdomen is soft, nondistended with appropriate focal tenderness involving the right lower quadrant.  The patient has mild guarding.  Assessment:  Perforated appendicitis with focal peritonitis  Plan:  Zosyn  Diet as tolerated  Mark Salinas MD

## 2024-02-02 NOTE — PROGRESS NOTES
went to see patient while rounding. Patient was sleeping.    prayed a silent prayer for the patient and left devotional material.  will attempt to follow up.

## 2024-02-02 NOTE — CARE COORDINATION
Met with patient and father, Brown about diagnosis and discharge plan of care. Pt admit for perforated appendicitis. Iv fluids, iv antibiotics. Clear liquid diet. Will repeat CT to eval abscess to see if needed drained. Max temp 101.8. pt lives with parents. Independent prior to admit. PCP is Dr Cooper. At this time, declining needs. Will continue to follow-mjo

## 2024-02-03 LAB
ANION GAP SERPL CALCULATED.3IONS-SCNC: 12 MMOL/L (ref 7–16)
BASOPHILS # BLD: 0.03 K/UL (ref 0–0.2)
BASOPHILS NFR BLD: 0 % (ref 0–2)
BUN SERPL-MCNC: 10 MG/DL (ref 6–20)
CALCIUM SERPL-MCNC: 8.8 MG/DL (ref 8.6–10.2)
CHLORIDE SERPL-SCNC: 100 MMOL/L (ref 98–107)
CO2 SERPL-SCNC: 23 MMOL/L (ref 22–29)
CREAT SERPL-MCNC: 0.9 MG/DL (ref 0.7–1.2)
EOSINOPHIL # BLD: 0.05 K/UL (ref 0.05–0.5)
EOSINOPHILS RELATIVE PERCENT: 1 % (ref 0–6)
ERYTHROCYTE [DISTWIDTH] IN BLOOD BY AUTOMATED COUNT: 11.9 % (ref 11.5–15)
GFR SERPL CREATININE-BSD FRML MDRD: >60 ML/MIN/1.73M2
GLUCOSE SERPL-MCNC: 88 MG/DL (ref 74–99)
HCT VFR BLD AUTO: 39.6 % (ref 37–54)
HGB BLD-MCNC: 13.9 G/DL (ref 12.5–16.5)
IMM GRANULOCYTES # BLD AUTO: 0.03 K/UL (ref 0–0.58)
IMM GRANULOCYTES NFR BLD: 0 % (ref 0–5)
LYMPHOCYTES NFR BLD: 1.21 K/UL (ref 1.5–4)
LYMPHOCYTES RELATIVE PERCENT: 12 % (ref 20–42)
MAGNESIUM SERPL-MCNC: 1.9 MG/DL (ref 1.6–2.6)
MCH RBC QN AUTO: 31.6 PG (ref 26–35)
MCHC RBC AUTO-ENTMCNC: 35.1 G/DL (ref 32–34.5)
MCV RBC AUTO: 90 FL (ref 80–99.9)
MICROORGANISM SPEC CULT: NORMAL
MONOCYTES NFR BLD: 0.81 K/UL (ref 0.1–0.95)
MONOCYTES NFR BLD: 8 % (ref 2–12)
NEUTROPHILS NFR BLD: 78 % (ref 43–80)
NEUTS SEG NFR BLD: 7.62 K/UL (ref 1.8–7.3)
PLATELET # BLD AUTO: 180 K/UL (ref 130–450)
PMV BLD AUTO: 11.2 FL (ref 7–12)
POTASSIUM SERPL-SCNC: 3.4 MMOL/L (ref 3.5–5)
RBC # BLD AUTO: 4.4 M/UL (ref 3.8–5.8)
SODIUM SERPL-SCNC: 135 MMOL/L (ref 132–146)
SPECIMEN DESCRIPTION: NORMAL
WBC OTHER # BLD: 9.8 K/UL (ref 4.5–11.5)

## 2024-02-03 PROCEDURE — 6360000002 HC RX W HCPCS: Performed by: STUDENT IN AN ORGANIZED HEALTH CARE EDUCATION/TRAINING PROGRAM

## 2024-02-03 PROCEDURE — 80048 BASIC METABOLIC PNL TOTAL CA: CPT

## 2024-02-03 PROCEDURE — 6370000000 HC RX 637 (ALT 250 FOR IP): Performed by: STUDENT IN AN ORGANIZED HEALTH CARE EDUCATION/TRAINING PROGRAM

## 2024-02-03 PROCEDURE — 2580000003 HC RX 258: Performed by: STUDENT IN AN ORGANIZED HEALTH CARE EDUCATION/TRAINING PROGRAM

## 2024-02-03 PROCEDURE — 87040 BLOOD CULTURE FOR BACTERIA: CPT

## 2024-02-03 PROCEDURE — 85025 COMPLETE CBC W/AUTO DIFF WBC: CPT

## 2024-02-03 PROCEDURE — 87324 CLOSTRIDIUM AG IA: CPT

## 2024-02-03 PROCEDURE — 1200000000 HC SEMI PRIVATE

## 2024-02-03 PROCEDURE — 87449 NOS EACH ORGANISM AG IA: CPT

## 2024-02-03 PROCEDURE — 83735 ASSAY OF MAGNESIUM: CPT

## 2024-02-03 RX ADMIN — PIPERACILLIN AND TAZOBACTAM 4500 MG: 4; .5 INJECTION, POWDER, FOR SOLUTION INTRAVENOUS at 01:47

## 2024-02-03 RX ADMIN — PIPERACILLIN AND TAZOBACTAM 4500 MG: 4; .5 INJECTION, POWDER, FOR SOLUTION INTRAVENOUS at 09:08

## 2024-02-03 RX ADMIN — SODIUM CHLORIDE, PRESERVATIVE FREE 10 ML: 5 INJECTION INTRAVENOUS at 21:39

## 2024-02-03 RX ADMIN — SODIUM CHLORIDE, PRESERVATIVE FREE 10 ML: 5 INJECTION INTRAVENOUS at 09:10

## 2024-02-03 RX ADMIN — ACETAMINOPHEN 650 MG: 325 TABLET ORAL at 09:09

## 2024-02-03 RX ADMIN — SODIUM CHLORIDE: 9 INJECTION, SOLUTION INTRAVENOUS at 23:52

## 2024-02-03 RX ADMIN — PIPERACILLIN AND TAZOBACTAM 4500 MG: 4; .5 INJECTION, POWDER, FOR SOLUTION INTRAVENOUS at 15:55

## 2024-02-03 RX ADMIN — ACETAMINOPHEN 650 MG: 325 TABLET ORAL at 01:48

## 2024-02-03 RX ADMIN — PIPERACILLIN AND TAZOBACTAM 4500 MG: 4; .5 INJECTION, POWDER, FOR SOLUTION INTRAVENOUS at 23:52

## 2024-02-03 RX ADMIN — ACETAMINOPHEN 650 MG: 325 TABLET ORAL at 21:37

## 2024-02-03 RX ADMIN — ACETAMINOPHEN 650 MG: 325 TABLET ORAL at 15:51

## 2024-02-03 ASSESSMENT — PAIN DESCRIPTION - ORIENTATION
ORIENTATION: RIGHT;LOWER
ORIENTATION: MID;LOWER
ORIENTATION: RIGHT;LOWER

## 2024-02-03 ASSESSMENT — PAIN SCALES - GENERAL
PAINLEVEL_OUTOF10: 0
PAINLEVEL_OUTOF10: 3
PAINLEVEL_OUTOF10: 1
PAINLEVEL_OUTOF10: 1

## 2024-02-03 ASSESSMENT — PAIN DESCRIPTION - LOCATION
LOCATION: ABDOMEN

## 2024-02-03 ASSESSMENT — PAIN DESCRIPTION - DESCRIPTORS
DESCRIPTORS: ACHING;DISCOMFORT
DESCRIPTORS: ACHING;DISCOMFORT
DESCRIPTORS: TENDER

## 2024-02-03 ASSESSMENT — PAIN - FUNCTIONAL ASSESSMENT: PAIN_FUNCTIONAL_ASSESSMENT: ACTIVITIES ARE NOT PREVENTED

## 2024-02-03 NOTE — PROGRESS NOTES
SPIRITUAL HEALTH SERVICES - [facility]  PROGRESS NOTE    Name: Sandeep Malave                Uatsdin: Mandaeism   Anointed (Last Rites): No    Referral: Routine Visit    Assessment:  Upon entering the room  observes patient calm and coping while laying in bed.  Patient was welcoming to the .      Intervention:   provided active listening, prayer, and nurtured hope.   left devotional material and information about  services.    Outcome:  Patient was encouraged, engaged in conversation, and expressed gratitude.    Plan:  Chaplains will remain available to offer spiritual and emotional support as needed.      Electronically signed by Chaplain Mik, on 2/2/2024 at 7:41 PM.  Spiritual Care Department  [facility]  [phone]

## 2024-02-03 NOTE — CONSULTS
rashes were noted. No jaundice.  HEENT: Eyes show round, and reactive pupils. Moist mucous membranes, no ulcerations, no thrush.   Neck: Supple to movements. No lymphadenopathy.   Chest: No use of accessory muscles to breathe. Symmetrical expansion. Auscultation reveals no wheezing, crackles, or rhonchi.   Cardiovascular: S1 and S2 are rhythmic and regular. No murmurs appreciated.   Abdomen: soft, distended. Right LQ tenderness. Lap scars healed well.  Extremities: No clubbing, no cyanosis, no edema.  Musculoskeletal: no gross focal abnormalities  Neurological: awake alert.  Lines: peripheral      CBC+dif:  Recent Labs     02/01/24  1057 02/02/24  0347 02/03/24  0513   WBC 10.0 9.4 9.8   HGB 15.5 13.2 13.9   HCT 44.9 37.0 39.6   MCV 90.3 88.5 90.0     --  180   NEUTROABS 8.41* 7.86* 7.62*     No results found for: \"CRP\"  No results found for: \"CRPHS\"  No results found for: \"SEDRATE\"  Lab Results   Component Value Date    ALT 17 02/01/2024    AST 32 02/01/2024    ALKPHOS 89 02/01/2024    BILITOT 1.1 02/01/2024     Lab Results   Component Value Date/Time     02/03/2024 05:13 AM    K 3.4 02/03/2024 05:13 AM     02/03/2024 05:13 AM    CO2 23 02/03/2024 05:13 AM    BUN 10 02/03/2024 05:13 AM    CREATININE 0.9 02/03/2024 05:13 AM    LABGLOM >60 02/03/2024 05:13 AM    GLUCOSE 88 02/03/2024 05:13 AM    PROT 7.9 02/01/2024 12:07 AM    LABALBU 4.7 02/01/2024 12:07 AM    CALCIUM 8.8 02/03/2024 05:13 AM    BILITOT 1.1 02/01/2024 12:07 AM    ALKPHOS 89 02/01/2024 12:07 AM    AST 32 02/01/2024 12:07 AM    ALT 17 02/01/2024 12:07 AM       No results found for: \"PROTIME\", \"INR\"    No results found for: \"TSH\"    Lab Results   Component Value Date/Time    COLORU Yellow 02/01/2024 12:07 AM    PHUR 6.0 02/01/2024 12:07 AM    WBCUA 0 TO 5 02/01/2024 12:07 AM    RBCUA 0 TO 2 02/01/2024 12:07 AM    SPECGRAV 1.020 02/01/2024 12:07 AM    LEUKOCYTESUR NEGATIVE 02/01/2024 12:07 AM    UROBILINOGEN 0.2 02/01/2024 12:07 AM     BILIRUBINUR NEGATIVE 02/01/2024 12:07 AM    GLUCOSEU NEGATIVE 02/01/2024 12:07 AM       No results found for: \"YAW9OGV\", \"BEART\", \"Y6VPKUOC\", \"PHART\", \"THGBART\", \"FXZ9ZGB\", \"PO2ART\", \"WOS3JTF\"  Radiology:  CT ABDOMEN PELVIS W IV CONTRAST Additional Contrast? None   Final Result   1. Findings consistent with perforated appendicitis. No drainable fluid   collection.   2. Diverticulosis.   3. Chronic bilateral L5 pars defects with grade 1 anterolisthesis of L5 on S1.   4. Small volume free fluid layering within the pelvis.   5. Mild wall thickening of the terminal ileum, likely reactive.             Microbiology:  Pending  No results for input(s): \"BC\" in the last 72 hours.  No results for input(s): \"ORG\" in the last 72 hours.  No results for input(s): \"BLOODCULT2\" in the last 72 hours.  No results for input(s): \"STREPNEUMAGU\" in the last 72 hours.  No results for input(s): \"LP1UAG\" in the last 72 hours.  No results for input(s): \"ASO\" in the last 72 hours.  No results for input(s): \"CULTRESP\" in the last 72 hours.    Assessment:  Perforated appendicitis:  Fever leukocytosis:  still has low grade fevers. White count normalized.    Plan:    Cont IV Zosyn 4.5 q8   Blood cx x 2.   If surgery is planning conservative management, will need IV antibiotics at discharge.   Dad at bed side. Spoke to him.  Monitor labs  Will follow with you    Thank you for having us see this patient in consultation. I will be discussing this case with the treating physicians.      Electronically signed by HUMBLE MELVIN MD on 2/3/2024 at 11:14 AM

## 2024-02-03 NOTE — PROGRESS NOTES
Progress note:  Overall, the patient feels better.  The patient is having flatus and bowel movements.  Afebrile  Tmax 100.4  Pulse rate 102  No recorded bowel movement  Laboratory studies pending  Physical examination:  The abdomen is soft, with slight distention, tympany and moderate right lower quadrant tenderness without guarding or rebound.  Assessment:  Acute appendicitis with perforation and local peritonitis  Slight ileus  Plan:  Continue Zosyn  Consult infectious disease  Possible repeat CT of the abdomen and pelvis tomorrow  Mark Salinas MD

## 2024-02-03 NOTE — PROGRESS NOTES
RN call out to Dr. Montoya (Dr. Arango on call ) re: new infectious disease consult re: Acute appendicitis with perforation and local peritonitis. Message left with answering service.

## 2024-02-04 LAB
ANION GAP SERPL CALCULATED.3IONS-SCNC: 15 MMOL/L (ref 7–16)
BASOPHILS # BLD: 0.03 K/UL (ref 0–0.2)
BASOPHILS NFR BLD: 0 % (ref 0–2)
BUN SERPL-MCNC: 11 MG/DL (ref 6–20)
C DIFF GDH + TOXINS A+B STL QL IA.RAPID: NEGATIVE
CALCIUM SERPL-MCNC: 8.9 MG/DL (ref 8.6–10.2)
CHLORIDE SERPL-SCNC: 101 MMOL/L (ref 98–107)
CO2 SERPL-SCNC: 21 MMOL/L (ref 22–29)
CREAT SERPL-MCNC: 0.9 MG/DL (ref 0.7–1.2)
EOSINOPHIL # BLD: 0.11 K/UL (ref 0.05–0.5)
EOSINOPHILS RELATIVE PERCENT: 1 % (ref 0–6)
ERYTHROCYTE [DISTWIDTH] IN BLOOD BY AUTOMATED COUNT: 12.1 % (ref 11.5–15)
GFR SERPL CREATININE-BSD FRML MDRD: >60 ML/MIN/1.73M2
GLUCOSE SERPL-MCNC: 84 MG/DL (ref 74–99)
HCT VFR BLD AUTO: 31.7 % (ref 37–54)
HGB BLD-MCNC: 11.1 G/DL (ref 12.5–16.5)
IMM GRANULOCYTES # BLD AUTO: 0.03 K/UL (ref 0–0.58)
IMM GRANULOCYTES NFR BLD: 0 % (ref 0–5)
LYMPHOCYTES NFR BLD: 1.36 K/UL (ref 1.5–4)
LYMPHOCYTES RELATIVE PERCENT: 15 % (ref 20–42)
MAGNESIUM SERPL-MCNC: 1.9 MG/DL (ref 1.6–2.6)
MCH RBC QN AUTO: 31.8 PG (ref 26–35)
MCHC RBC AUTO-ENTMCNC: 35 G/DL (ref 32–34.5)
MCV RBC AUTO: 90.8 FL (ref 80–99.9)
MONOCYTES NFR BLD: 0.67 K/UL (ref 0.1–0.95)
MONOCYTES NFR BLD: 8 % (ref 2–12)
NEUTROPHILS NFR BLD: 75 % (ref 43–80)
NEUTS SEG NFR BLD: 6.62 K/UL (ref 1.8–7.3)
PLATELET # BLD AUTO: 213 K/UL (ref 130–450)
PMV BLD AUTO: 10.3 FL (ref 7–12)
POTASSIUM SERPL-SCNC: 3.4 MMOL/L (ref 3.5–5)
RBC # BLD AUTO: 3.49 M/UL (ref 3.8–5.8)
SODIUM SERPL-SCNC: 137 MMOL/L (ref 132–146)
SPECIMEN DESCRIPTION: NORMAL
WBC OTHER # BLD: 8.8 K/UL (ref 4.5–11.5)

## 2024-02-04 PROCEDURE — 6360000002 HC RX W HCPCS: Performed by: STUDENT IN AN ORGANIZED HEALTH CARE EDUCATION/TRAINING PROGRAM

## 2024-02-04 PROCEDURE — 1200000000 HC SEMI PRIVATE

## 2024-02-04 PROCEDURE — 85025 COMPLETE CBC W/AUTO DIFF WBC: CPT

## 2024-02-04 PROCEDURE — 2580000003 HC RX 258: Performed by: STUDENT IN AN ORGANIZED HEALTH CARE EDUCATION/TRAINING PROGRAM

## 2024-02-04 PROCEDURE — 80048 BASIC METABOLIC PNL TOTAL CA: CPT

## 2024-02-04 PROCEDURE — 83735 ASSAY OF MAGNESIUM: CPT

## 2024-02-04 PROCEDURE — 6370000000 HC RX 637 (ALT 250 FOR IP): Performed by: STUDENT IN AN ORGANIZED HEALTH CARE EDUCATION/TRAINING PROGRAM

## 2024-02-04 PROCEDURE — 6370000000 HC RX 637 (ALT 250 FOR IP): Performed by: SURGERY

## 2024-02-04 RX ORDER — SODIUM CHLORIDE 9 MG/ML
INJECTION, SOLUTION INTRAVENOUS PRN
Status: DISCONTINUED | OUTPATIENT
Start: 2024-02-04 | End: 2024-02-06 | Stop reason: HOSPADM

## 2024-02-04 RX ORDER — POTASSIUM CHLORIDE 20 MEQ/1
20 TABLET, EXTENDED RELEASE ORAL ONCE
Status: COMPLETED | OUTPATIENT
Start: 2024-02-04 | End: 2024-02-04

## 2024-02-04 RX ORDER — HEPARIN 100 UNIT/ML
3 SYRINGE INTRAVENOUS PRN
Status: DISCONTINUED | OUTPATIENT
Start: 2024-02-04 | End: 2024-02-06 | Stop reason: HOSPADM

## 2024-02-04 RX ORDER — SODIUM CHLORIDE 0.9 % (FLUSH) 0.9 %
5-40 SYRINGE (ML) INJECTION EVERY 12 HOURS SCHEDULED
Status: DISCONTINUED | OUTPATIENT
Start: 2024-02-04 | End: 2024-02-06 | Stop reason: HOSPADM

## 2024-02-04 RX ORDER — HEPARIN 100 UNIT/ML
3 SYRINGE INTRAVENOUS EVERY 12 HOURS SCHEDULED
Status: DISCONTINUED | OUTPATIENT
Start: 2024-02-04 | End: 2024-02-06 | Stop reason: HOSPADM

## 2024-02-04 RX ORDER — SODIUM CHLORIDE 0.9 % (FLUSH) 0.9 %
5-40 SYRINGE (ML) INJECTION PRN
Status: DISCONTINUED | OUTPATIENT
Start: 2024-02-04 | End: 2024-02-06 | Stop reason: HOSPADM

## 2024-02-04 RX ORDER — LIDOCAINE HYDROCHLORIDE 10 MG/ML
5 INJECTION, SOLUTION EPIDURAL; INFILTRATION; INTRACAUDAL; PERINEURAL ONCE
Status: COMPLETED | OUTPATIENT
Start: 2024-02-04 | End: 2024-02-05

## 2024-02-04 RX ADMIN — SODIUM CHLORIDE, PRESERVATIVE FREE 10 ML: 5 INJECTION INTRAVENOUS at 10:26

## 2024-02-04 RX ADMIN — PIPERACILLIN AND TAZOBACTAM 4500 MG: 4; .5 INJECTION, POWDER, FOR SOLUTION INTRAVENOUS at 10:28

## 2024-02-04 RX ADMIN — ACETAMINOPHEN 650 MG: 325 TABLET ORAL at 17:17

## 2024-02-04 RX ADMIN — ACETAMINOPHEN 650 MG: 325 TABLET ORAL at 03:42

## 2024-02-04 RX ADMIN — PIPERACILLIN AND TAZOBACTAM 4500 MG: 4; .5 INJECTION, POWDER, FOR SOLUTION INTRAVENOUS at 17:14

## 2024-02-04 RX ADMIN — POTASSIUM CHLORIDE 20 MEQ: 1500 TABLET, EXTENDED RELEASE ORAL at 10:26

## 2024-02-04 RX ADMIN — ACETAMINOPHEN 650 MG: 325 TABLET ORAL at 10:25

## 2024-02-04 ASSESSMENT — PAIN DESCRIPTION - DESCRIPTORS
DESCRIPTORS: TENDER
DESCRIPTORS: ACHING;DISCOMFORT

## 2024-02-04 ASSESSMENT — PAIN DESCRIPTION - LOCATION
LOCATION: ABDOMEN
LOCATION: ABDOMEN

## 2024-02-04 ASSESSMENT — PAIN SCALES - GENERAL
PAINLEVEL_OUTOF10: 0
PAINLEVEL_OUTOF10: 2

## 2024-02-04 ASSESSMENT — PAIN DESCRIPTION - ORIENTATION
ORIENTATION: MID;RIGHT
ORIENTATION: RIGHT;LOWER

## 2024-02-04 ASSESSMENT — PAIN - FUNCTIONAL ASSESSMENT: PAIN_FUNCTIONAL_ASSESSMENT: ACTIVITIES ARE NOT PREVENTED

## 2024-02-04 NOTE — PROGRESS NOTES
Progress note:  Overall, the patient states that he feels better.  The patient had loose stools yesterday but are now more solid.  Afebrile   Pulse rate, respirations and blood pressure  Eight recorded bowel movements yesterday  Potassium 3.4  Physical examination:  The abdomen is soft, with mild distention and right lower quadrant tenderness without guarding or rebound  Assessment:  Perforated acute appendicitis with focal peritonitis  Diarrhea improved  Plan:  Continue Zosyn  Await stool culture results  Replace potassium  Appreciate infectious disease input.  Likely, the patient will have a PICC line placed and sent home on IV antibiotics.  Mark Salinas MD

## 2024-02-04 NOTE — PROGRESS NOTES
[facility]  [facility]  Progress Note    SUBJECTIVE:  Chief Complaint   Patient presents with    Abdominal Pain     Mid abd pain that radiates down to RLQ x 2 days, symptoms began Tuesday morning.    Diarrhea    Fever     100.8 F at home today      Patient is tolerating medications. No reported adverse drug reactions.  Says that he gets a burning feeling in his stomach when he is getting the antibiotic  No fevers - tmax 99.5   Stools are soft today - no longer having diarrhea      Review of systems:  As stated above in the chief complaint, otherwise negative.    Medications:  Scheduled Meds:   piperacillin-tazobactam  4,500 mg IntraVENous Q8H    sodium chloride flush  5-40 mL IntraVENous 2 times per day    enoxaparin  40 mg SubCUTAneous Daily    acetaminophen  650 mg Oral Q6H    methocarbamol  1,000 mg Oral 4x Daily     Continuous Infusions:   sodium chloride 12.5 mL/hr at 24 2352     PRN Meds:sodium chloride flush, sodium chloride, ondansetron **OR** ondansetron, oxyCODONE **OR** oxyCODONE, HYDROmorphone    OBJECTIVE:  /81   Pulse 94   Temp 97.7 °F (36.5 °C) (Oral)   Resp 18   Ht 1.803 m (5' 11\")   Wt 79.4 kg (175 lb)   SpO2 98%   BMI 24.41 kg/m²   Temp  Av.1 °F (36.7 °C)  Min: 97 °F (36.1 °C)  Max: 99.5 °F (37.5 °C)  Constitutional: The patient is awake, alert, and oriented. Sitting up in the chair, mom at bedside.   Skin: Warm and dry. No rashes were noted.   HEENT: Round and reactive pupils.  Moist mucous membranes.  No ulcerations or thrush.  Neck: Supple to movements.   Chest: No use of accessory muscles to breathe. Symmetrical expansion.  No wheezing, crackles or rhonchi.  Cardiovascular: S1 and S2 are rhythmic and regular. No murmurs appreciated.   Abdomen: Positive bowel sounds to auscultation. Minimal tenderness to palpation RLQ. No masses felt.   Extremities: No clubbing, no cyanosis, no edema.  Lines: Peripheral.    Laboratory, Tests, and  Consultants notes:  Reviewed     Radiology:  Reviewed     Microbiology:   Blood culture 2/3: negative so far  MRSA screening: negative    ASSESSMENT:  Perforated appendicitis:  Fever and leukocytosis: temps are trending down, tmax 99.5 last evening.     PLAN:  Continue Zosyn   Check final cultures - negative so far  Monitor labs  Surgery planning conservative management - will likely be going home with PICC and IV antibiotics.    GILLIAN Ross - CNP  10:40 AM  2/4/2024    Pt seen and examined. Above discussed agree with advanced practice nurse. Labs, cultures, and radiographs reviewed.  Face to Face encounter occurred. Changes made as necessary.   Spoke to mom at bed side. Ordered PICC. Will need to be arranged for IV antibiotics at home. Will do med rec tomorrow. Repeat CT in 2 weeks.       Louie Arango MD

## 2024-02-05 LAB
ANION GAP SERPL CALCULATED.3IONS-SCNC: 12 MMOL/L (ref 7–16)
ATYPICAL LYMPHOCYTE ABSOLUTE COUNT: 0.13 K/UL (ref 0–0.46)
ATYPICAL LYMPHOCYTES: 2 % (ref 0–4)
BASOPHILS # BLD: 0 K/UL (ref 0–0.2)
BASOPHILS NFR BLD: 0 % (ref 0–2)
BUN SERPL-MCNC: 8 MG/DL (ref 6–20)
CALCIUM SERPL-MCNC: 9.1 MG/DL (ref 8.6–10.2)
CHLORIDE SERPL-SCNC: 101 MMOL/L (ref 98–107)
CO2 SERPL-SCNC: 24 MMOL/L (ref 22–29)
CREAT SERPL-MCNC: 0.8 MG/DL (ref 0.7–1.2)
EOSINOPHIL # BLD: 0.2 K/UL (ref 0.05–0.5)
EOSINOPHILS RELATIVE PERCENT: 3 % (ref 0–6)
ERYTHROCYTE [DISTWIDTH] IN BLOOD BY AUTOMATED COUNT: 12 % (ref 11.5–15)
GFR SERPL CREATININE-BSD FRML MDRD: >60 ML/MIN/1.73M2
GLUCOSE SERPL-MCNC: 96 MG/DL (ref 74–99)
HCT VFR BLD AUTO: 39.4 % (ref 37–54)
HGB BLD-MCNC: 13.4 G/DL (ref 12.5–16.5)
LYMPHOCYTES NFR BLD: 0.6 K/UL (ref 1.5–4)
LYMPHOCYTES RELATIVE PERCENT: 8 % (ref 20–42)
MCH RBC QN AUTO: 31.2 PG (ref 26–35)
MCHC RBC AUTO-ENTMCNC: 34 G/DL (ref 32–34.5)
MCV RBC AUTO: 91.6 FL (ref 80–99.9)
MONOCYTES NFR BLD: 0.4 K/UL (ref 0.1–0.95)
MONOCYTES NFR BLD: 5 % (ref 2–12)
NEUTROPHILS NFR BLD: 83 % (ref 43–80)
NEUTS SEG NFR BLD: 6.27 K/UL (ref 1.8–7.3)
NUCLEATED RED BLOOD CELLS: 1 PER 100 WBC
PLATELET # BLD AUTO: 240 K/UL (ref 130–450)
PMV BLD AUTO: 10.3 FL (ref 7–12)
POTASSIUM SERPL-SCNC: 3.6 MMOL/L (ref 3.5–5)
RBC # BLD AUTO: 4.3 M/UL (ref 3.8–5.8)
RBC # BLD: ABNORMAL 10*6/UL
SODIUM SERPL-SCNC: 137 MMOL/L (ref 132–146)
WBC OTHER # BLD: 7.6 K/UL (ref 4.5–11.5)

## 2024-02-05 PROCEDURE — 6370000000 HC RX 637 (ALT 250 FOR IP): Performed by: STUDENT IN AN ORGANIZED HEALTH CARE EDUCATION/TRAINING PROGRAM

## 2024-02-05 PROCEDURE — 6360000002 HC RX W HCPCS: Performed by: STUDENT IN AN ORGANIZED HEALTH CARE EDUCATION/TRAINING PROGRAM

## 2024-02-05 PROCEDURE — 85025 COMPLETE CBC W/AUTO DIFF WBC: CPT

## 2024-02-05 PROCEDURE — 2580000003 HC RX 258: Performed by: STUDENT IN AN ORGANIZED HEALTH CARE EDUCATION/TRAINING PROGRAM

## 2024-02-05 PROCEDURE — 2500000003 HC RX 250 WO HCPCS: Performed by: REGISTERED NURSE

## 2024-02-05 PROCEDURE — 6360000002 HC RX W HCPCS: Performed by: REGISTERED NURSE

## 2024-02-05 PROCEDURE — C1751 CATH, INF, PER/CENT/MIDLINE: HCPCS

## 2024-02-05 PROCEDURE — 76937 US GUIDE VASCULAR ACCESS: CPT

## 2024-02-05 PROCEDURE — 2580000003 HC RX 258: Performed by: REGISTERED NURSE

## 2024-02-05 PROCEDURE — 1200000000 HC SEMI PRIVATE

## 2024-02-05 PROCEDURE — 02HV33Z INSERTION OF INFUSION DEVICE INTO SUPERIOR VENA CAVA, PERCUTANEOUS APPROACH: ICD-10-PCS | Performed by: SURGERY

## 2024-02-05 PROCEDURE — 80048 BASIC METABOLIC PNL TOTAL CA: CPT

## 2024-02-05 PROCEDURE — 36569 INSJ PICC 5 YR+ W/O IMAGING: CPT

## 2024-02-05 RX ORDER — POTASSIUM CHLORIDE 20 MEQ/1
40 TABLET, EXTENDED RELEASE ORAL ONCE
Status: COMPLETED | OUTPATIENT
Start: 2024-02-05 | End: 2024-02-05

## 2024-02-05 RX ADMIN — PIPERACILLIN AND TAZOBACTAM 4500 MG: 4; .5 INJECTION, POWDER, FOR SOLUTION INTRAVENOUS at 17:47

## 2024-02-05 RX ADMIN — PIPERACILLIN AND TAZOBACTAM 4500 MG: 4; .5 INJECTION, POWDER, FOR SOLUTION INTRAVENOUS at 08:43

## 2024-02-05 RX ADMIN — ACETAMINOPHEN 650 MG: 325 TABLET ORAL at 05:35

## 2024-02-05 RX ADMIN — SODIUM CHLORIDE, PRESERVATIVE FREE 10 ML: 5 INJECTION INTRAVENOUS at 08:44

## 2024-02-05 RX ADMIN — SODIUM CHLORIDE, PRESERVATIVE FREE 10 ML: 5 INJECTION INTRAVENOUS at 21:11

## 2024-02-05 RX ADMIN — POTASSIUM CHLORIDE 40 MEQ: 1500 TABLET, EXTENDED RELEASE ORAL at 11:55

## 2024-02-05 RX ADMIN — METHOCARBAMOL TABLETS 1000 MG: 500 TABLET, COATED ORAL at 21:12

## 2024-02-05 RX ADMIN — LIDOCAINE HYDROCHLORIDE 0.5 ML: 10 SOLUTION INTRAVENOUS at 17:35

## 2024-02-05 RX ADMIN — PIPERACILLIN AND TAZOBACTAM 4500 MG: 4; .5 INJECTION, POWDER, FOR SOLUTION INTRAVENOUS at 00:46

## 2024-02-05 RX ADMIN — ACETAMINOPHEN 650 MG: 325 TABLET ORAL at 00:45

## 2024-02-05 RX ADMIN — Medication 300 UNITS: at 21:11

## 2024-02-05 ASSESSMENT — PAIN - FUNCTIONAL ASSESSMENT
PAIN_FUNCTIONAL_ASSESSMENT: ACTIVITIES ARE NOT PREVENTED

## 2024-02-05 ASSESSMENT — PAIN DESCRIPTION - DESCRIPTORS
DESCRIPTORS: TENDER
DESCRIPTORS: CRAMPING
DESCRIPTORS: TENDER

## 2024-02-05 ASSESSMENT — PAIN DESCRIPTION - ORIENTATION
ORIENTATION: LOWER
ORIENTATION: MID;LOWER
ORIENTATION: MID;LOWER

## 2024-02-05 ASSESSMENT — PAIN DESCRIPTION - LOCATION
LOCATION: ABDOMEN

## 2024-02-05 ASSESSMENT — PAIN SCALES - GENERAL
PAINLEVEL_OUTOF10: 0
PAINLEVEL_OUTOF10: 4
PAINLEVEL_OUTOF10: 3
PAINLEVEL_OUTOF10: 0

## 2024-02-05 NOTE — PROCEDURES
PICC    Catheter insertion date: 2/5/2024     Product Number:  [id number]   Lot No: [id number]   Gauge: 17   Lumen: single   L Basilic    Vein Diameter: 0.53cm   Arm circumference at insertion site: 31cm   Catheter Length: 40cm   Internal Length: 40cm   External Catheter Length: 0cm   Ultrasound Used: yes  WILBERT Coreas confirms PICC tip is placed in the lower 1/3 of the SVC or at the Cavoatrial junction.  Floor nurse notified PICC is okay to use.   : RICKIE Figueroa RN [facility]

## 2024-02-05 NOTE — PROGRESS NOTES
GENERAL SURGERY  DAILY PROGRESS NOTE  2/5/2024    CHIEF COMPLAINT:  Chief Complaint   Patient presents with    Abdominal Pain     Mid abd pain that radiates down to RLQ x 2 days, symptoms began Tuesday morning.    Diarrhea    Fever     100.8 F at home today        SUBJECTIVE:  Repeat CT performed over the weekend with no drainable fluid collection. No nausea or vomiting. Having BM. Tolerating diet    OBJECTIVE:  /83   Pulse 87   Temp 97.6 °F (36.4 °C) (Oral)   Resp 18   Ht 1.803 m (5' 11\")   Wt 79.4 kg (175 lb)   SpO2 100%   BMI 24.41 kg/m²     GENERAL:  NAD. A&Ox3.  LUNGS:  No increased work of breathing.  CARDIOVASCULAR: RR  ABDOMEN:  Soft, non-distended, focally tender to palpation RLQ. No guarding, rigidity, rebound.  EXT: warm and well perfused, normal MO    ASSESSMENT/PLAN:  23 y.o. male with perforated appendicitis, planning for conservative management.     - continue regular diet  - continue IV antibiotics, appreciate ID assistance and management  -per ID recommending PICC for outpatient IV abx  - OOB, ambulate  - prn pain control  - prn anti-emetics    Discussed with Dr. Salinas.     Michelle Bright MD  Surgery Resident PGY-3  2/5/2024  4:57 AM    The patient was seen and examined and the chart was reviewed.  I agree with the assessment and plan.

## 2024-02-05 NOTE — PROGRESS NOTES
[facility]  [facility]  Progress Note    SUBJECTIVE:  Chief Complaint   Patient presents with    Abdominal Pain     Mid abd pain that radiates down to RLQ x 2 days, symptoms began Tuesday morning.    Diarrhea    Fever     100.8 F at home today      Patient is tolerating medications. No reported adverse drug reactions.  He is feeling a lot better. Able to walk around. Eating and have BM getting solid now.       Review of systems:  As stated above in the chief complaint, otherwise negative.    Medications:  Scheduled Meds:   lidocaine PF  5 mL IntraDERmal Once    sodium chloride flush  5-40 mL IntraVENous 2 times per day    heparin flush  3 mL IntraVENous 2 times per day    piperacillin-tazobactam  4,500 mg IntraVENous Q8H    sodium chloride flush  5-40 mL IntraVENous 2 times per day    enoxaparin  40 mg SubCUTAneous Daily    acetaminophen  650 mg Oral Q6H    methocarbamol  1,000 mg Oral 4x Daily     Continuous Infusions:   sodium chloride      sodium chloride 12.5 mL/hr at 24 2352     PRN Meds:sodium chloride flush, sodium chloride, heparin flush, sodium chloride flush, sodium chloride, ondansetron **OR** ondansetron, oxyCODONE **OR** oxyCODONE    OBJECTIVE:  /75   Pulse 83   Temp 98.4 °F (36.9 °C) (Oral)   Resp 18   Ht 1.803 m (5' 11\")   Wt 79.4 kg (175 lb)   SpO2 100%   BMI 24.41 kg/m²   Temp  Av.9 °F (36.6 °C)  Min: 97.6 °F (36.4 °C)  Max: 98.4 °F (36.9 °C)  Constitutional: The patient is awake, alert, and oriented. Sitting up in the chair, mom at bedside.   Skin: Warm and dry. No rashes were noted.   HEENT: Round and reactive pupils.  Moist mucous membranes.  No ulcerations or thrush.  Neck: Supple to movements.   Chest: No use of accessory muscles to breathe. Symmetrical expansion.  No wheezing, crackles or rhonchi.  Cardiovascular: S1 and S2 are rhythmic and regular. No murmurs appreciated.   Abdomen: Positive bowel sounds to auscultation. Minimal  tenderness to palpation RLQ. No masses felt.   Extremities: No clubbing, no cyanosis, no edema.  Lines: Peripheral.    Laboratory, Tests, and Consultants notes:  Reviewed     Radiology:  Reviewed     Microbiology:   Blood culture 2/3: negative so far  MRSA screening: negative    ASSESSMENT:  Perforated appendicitis:  Fever and leukocytosis:resolved    PLAN:  Continue Zosyn - plan for 2 weeks.   Pending PICC placement.   Repeat CT abdomen in 2 weeks.   Script in chart. Discharge instructions in place.   Monitor labs  Will follow with you    HUMBLE MELVIN MD  2:34 PM  2/5/2024

## 2024-02-05 NOTE — CARE COORDINATION
Updated plan of care. ID recommend IV antibiotics. Discussed with pt and son home care options. Referral called to [facility] and [facility]. Await final iv antibiotics to determine cost. Pt needs PICC line inserted. Will follow-mjo    ADDEND: updated [facility] and [facility] of possible discharge tomorrow. Script on chart. Cost for med per Iván is 0 copay. Still await PICC line insertion-mjo

## 2024-02-05 NOTE — PATIENT CARE CONFERENCE
P Quality Flow/Interdisciplinary Rounds Progress Note        Quality Flow Rounds held on February 5, 2024    Disciplines Attending:  Bedside Nurse, , , and Nursing Unit Leadership    Sandeep Malave was admitted on 2/1/2024  1:24 AM    Anticipated Discharge Date:       Disposition:    Pavan Score:  Pavan Scale Score: 20    Readmission Risk              Risk of Unplanned Readmission:  9           Discussed patient goal for the day, patient clinical progression, and barriers to discharge.  The following Goal(s) of the Day/Commitment(s) have been identified:  discharge plan is home with homecare for iv antibiotics. Will need picc line.       Cele Rosen RN  February 5, 2024

## 2024-02-05 NOTE — DISCHARGE INSTRUCTIONS
name  Route  Frequency  Start/Stop date      ROUTINE LABS TO BE DRAWN/ORDERED:  Twice weekly (preferably every Monday & Thursday):  BUN Creatinine and liver panel  Complete Blood Count with differential (CBC with dif)  Once weekly:  C-Reactive Protein (not high sensitivity CRP)  Erythrocyte/Westergren Sedimentation Rate (WSR or ESR)  Total CPK for patients on Daptomycin (Cubicin®). Obtain CPK more often if the patient is experiencing muscle weakness or myalgias.  Clinical Pharmacist is to adjust Vancomycin and Aminoglycosides unless otherwise indicated.  Draw Vancomycin trough 30 minutes before the third dose  After starting drug, or   After the dosing or interval is changed.  If the trough level is between 5 and 20 continue dose as ordered.  Draw troughs twice weekly thereafter until troughs are stable (i.e. until trough is between 5 and 20 mcg/ml for two consecutive laboratory values).  Once stable check troughs once weekly or every third dose.  Please do not call physician unless the trough is < 5 or >20.  If the trough is <20 continue dosing as ordered.  If the trough is >20 call the office for further orders.  Do not hold the dose while waiting for the trough result.  Amingoglycosides (e.g. Gentamicin, Tobramycin and Amikacin) peaks and troughs should be drawn twice weekly (preferably on Mondays and Thursdays) or every third dose.  Aminoglycoside peaks are not to be drawn if patient on Once-Daily Dosing (ODD).  Call physician or office if the trough is:  >1 for gentamicin,   >2 for tobramycin, or   >5 for amikacin  When clinically indicated obtain:  Urine culture. If the patient has a fever with purulent drainage from Pelletier or suprapubic catheter, or foul smelling urine. Do not irrigate a clogged Pelletier catheter. Replace it.  Blood cultures and Wound Gram stain with culture & sensitivity. If the patient has a fever or increasing drainage or foul odor from a wound. Notify the treating physician in a timely  MD Louie Arango MD

## 2024-02-06 VITALS
HEART RATE: 88 BPM | OXYGEN SATURATION: 99 % | DIASTOLIC BLOOD PRESSURE: 84 MMHG | HEIGHT: 71 IN | BODY MASS INDEX: 24.5 KG/M2 | WEIGHT: 175 LBS | TEMPERATURE: 97.7 F | RESPIRATION RATE: 17 BRPM | SYSTOLIC BLOOD PRESSURE: 140 MMHG

## 2024-02-06 LAB
ANION GAP SERPL CALCULATED.3IONS-SCNC: 12 MMOL/L (ref 7–16)
ATYPICAL LYMPHOCYTE ABSOLUTE COUNT: 0.49 K/UL (ref 0–0.46)
ATYPICAL LYMPHOCYTES: 6 % (ref 0–4)
BASOPHILS # BLD: 0 K/UL (ref 0–0.2)
BASOPHILS NFR BLD: 0 % (ref 0–2)
BUN SERPL-MCNC: 9 MG/DL (ref 6–20)
CALCIUM SERPL-MCNC: 9 MG/DL (ref 8.6–10.2)
CHLORIDE SERPL-SCNC: 106 MMOL/L (ref 98–107)
CO2 SERPL-SCNC: 21 MMOL/L (ref 22–29)
CREAT SERPL-MCNC: 0.8 MG/DL (ref 0.7–1.2)
EOSINOPHIL # BLD: 0.07 K/UL (ref 0.05–0.5)
EOSINOPHILS RELATIVE PERCENT: 1 % (ref 0–6)
ERYTHROCYTE [DISTWIDTH] IN BLOOD BY AUTOMATED COUNT: 12 % (ref 11.5–15)
GFR SERPL CREATININE-BSD FRML MDRD: >60 ML/MIN/1.73M2
GLUCOSE SERPL-MCNC: 82 MG/DL (ref 74–99)
HCT VFR BLD AUTO: 40 % (ref 37–54)
HGB BLD-MCNC: 13.6 G/DL (ref 12.5–16.5)
LYMPHOCYTES NFR BLD: 1.55 K/UL (ref 1.5–4)
LYMPHOCYTES RELATIVE PERCENT: 19 % (ref 20–42)
MCH RBC QN AUTO: 31.1 PG (ref 26–35)
MCHC RBC AUTO-ENTMCNC: 34 G/DL (ref 32–34.5)
MCV RBC AUTO: 91.5 FL (ref 80–99.9)
MONOCYTES NFR BLD: 0.63 K/UL (ref 0.1–0.95)
MONOCYTES NFR BLD: 8 % (ref 2–12)
MYELOCYTES ABSOLUTE COUNT: 0.07 K/UL
MYELOCYTES: 1 %
NEUTROPHILS NFR BLD: 65 % (ref 43–80)
NEUTS SEG NFR BLD: 5.28 K/UL (ref 1.8–7.3)
PLATELET # BLD AUTO: 235 K/UL (ref 130–450)
PMV BLD AUTO: 9.8 FL (ref 7–12)
POTASSIUM SERPL-SCNC: 4.2 MMOL/L (ref 3.5–5)
RBC # BLD AUTO: 4.37 M/UL (ref 3.8–5.8)
RBC # BLD: NORMAL 10*6/UL
SODIUM SERPL-SCNC: 139 MMOL/L (ref 132–146)
WBC OTHER # BLD: 8.1 K/UL (ref 4.5–11.5)

## 2024-02-06 PROCEDURE — 36415 COLL VENOUS BLD VENIPUNCTURE: CPT

## 2024-02-06 PROCEDURE — 6360000002 HC RX W HCPCS: Performed by: STUDENT IN AN ORGANIZED HEALTH CARE EDUCATION/TRAINING PROGRAM

## 2024-02-06 PROCEDURE — 6370000000 HC RX 637 (ALT 250 FOR IP): Performed by: STUDENT IN AN ORGANIZED HEALTH CARE EDUCATION/TRAINING PROGRAM

## 2024-02-06 PROCEDURE — 85025 COMPLETE CBC W/AUTO DIFF WBC: CPT

## 2024-02-06 PROCEDURE — 2580000003 HC RX 258: Performed by: STUDENT IN AN ORGANIZED HEALTH CARE EDUCATION/TRAINING PROGRAM

## 2024-02-06 PROCEDURE — 80048 BASIC METABOLIC PNL TOTAL CA: CPT

## 2024-02-06 RX ORDER — OXYCODONE HYDROCHLORIDE AND ACETAMINOPHEN 5; 325 MG/1; MG/1
1 TABLET ORAL EVERY 6 HOURS PRN
Qty: 20 TABLET | Refills: 0 | Status: SHIPPED | OUTPATIENT
Start: 2024-02-06 | End: 2024-02-11

## 2024-02-06 RX ADMIN — ACETAMINOPHEN 650 MG: 325 TABLET ORAL at 01:51

## 2024-02-06 RX ADMIN — PIPERACILLIN AND TAZOBACTAM 4500 MG: 4; .5 INJECTION, POWDER, FOR SOLUTION INTRAVENOUS at 17:33

## 2024-02-06 RX ADMIN — PIPERACILLIN AND TAZOBACTAM 4500 MG: 4; .5 INJECTION, POWDER, FOR SOLUTION INTRAVENOUS at 10:13

## 2024-02-06 RX ADMIN — PIPERACILLIN AND TAZOBACTAM 4500 MG: 4; .5 INJECTION, POWDER, FOR SOLUTION INTRAVENOUS at 01:56

## 2024-02-06 ASSESSMENT — PAIN - FUNCTIONAL ASSESSMENT: PAIN_FUNCTIONAL_ASSESSMENT: ACTIVITIES ARE NOT PREVENTED

## 2024-02-06 ASSESSMENT — PAIN SCALES - GENERAL: PAINLEVEL_OUTOF10: 4

## 2024-02-06 ASSESSMENT — PAIN DESCRIPTION - DESCRIPTORS: DESCRIPTORS: CRAMPING

## 2024-02-06 ASSESSMENT — PAIN DESCRIPTION - LOCATION: LOCATION: ABDOMEN

## 2024-02-06 ASSESSMENT — PAIN DESCRIPTION - ORIENTATION: ORIENTATION: LOWER

## 2024-02-06 NOTE — PROGRESS NOTES
Pt stated that he does not feel that he needs pain medication at this time. Pt and father aware that script is good for 14 days should they feel it's needed after discharge.

## 2024-02-06 NOTE — PROGRESS NOTES
Okay to give 6pm dose of Zosyn over 1 hour and discharge patient home per Dr. Arango as long as home health care comes in the am to see patient.

## 2024-02-06 NOTE — FLOWSHEET NOTE
02/06/24 1830   AVS Reviewed   AVS & discharge instructions reviewed with patient and/or representative? Yes   Reviewed instructions with Patient   Level of Understanding Questions answered;Teach back completed;Verbalized understanding     Education Documentation  Home Health Care Services, taught by Corry Levi RN at 2/6/2024  6:29 PM.  Learner: Family, Patient  Readiness: Acceptance  Method: Explanation  Response: Verbalizes Understanding    Follow-up Appointments, taught by Corry Levi RN at 2/6/2024  6:29 PM.  Learner: Family, Patient  Readiness: Acceptance  Method: Explanation  Response: Verbalizes Understanding    When to Call the Doctor, taught by Corry Levi RN at 2/6/2024  6:29 PM.  Learner: Family, Patient  Readiness: Acceptance  Method: Explanation  Response: Verbalizes Understanding    Review Discharge Plan, taught by Corry Levi RN at 2/6/2024  6:29 PM.  Learner: Family, Patient  Readiness: Acceptance  Method: Explanation  Response: Verbalizes Understanding    Heparin Sod (Pork) Lock Flush, taught by Corry Levi RN at 2/6/2024  6:29 PM.  Learner: Family, Patient  Readiness: Acceptance  Method: Explanation  Response: Verbalizes Understanding    Lidocaine HCl, taught by Corry Levi RN at 2/6/2024  6:29 PM.  Learner: Family, Patient  Readiness: Acceptance  Method: Explanation  Response: Verbalizes Understanding    Potassium Chloride [facility], taught by Corry Levi RN at 2/6/2024  6:29 PM.  Learner: Family, Patient  Readiness: Acceptance  Method: Explanation  Response: Verbalizes Understanding    Ampicillin-Sulbactam Sodium, taught by Corry Levi RN at 2/6/2024  6:29 PM.  Learner: Family, Patient  Readiness: Acceptance  Method: Explanation  Response: Verbalizes Understanding    Methocarbamol, taught by Corry Levi RN at 2/6/2024  6:29 PM.  Learner: Family, Patient  Readiness: Acceptance  Method: Explanation  Response: Verbalizes Understanding    oxyCODONE HCl,  education on use of PICC line given to patient and family.   Discharge instructions given to patient. Medication administration, follow up appointments and special instructions discussed. Patient thankful for the care received.

## 2024-02-06 NOTE — PROGRESS NOTES
[facility]  [facility]  Progress Note    SUBJECTIVE:  Chief Complaint   Patient presents with    Abdominal Pain     Mid abd pain that radiates down to RLQ x 2 days, symptoms began Tuesday morning.    Diarrhea    Fever     100.8 F at home today      Patient is tolerating medications. No reported adverse drug reactions.  He is feeling a lot better. Able to walk around. Ready to go home.      Review of systems:  As stated above in the chief complaint, otherwise negative.    Medications:  Scheduled Meds:   sodium chloride flush  5-40 mL IntraVENous 2 times per day    heparin flush  3 mL IntraVENous 2 times per day    piperacillin-tazobactam  4,500 mg IntraVENous Q8H    sodium chloride flush  5-40 mL IntraVENous 2 times per day    enoxaparin  40 mg SubCUTAneous Daily    acetaminophen  650 mg Oral Q6H    methocarbamol  1,000 mg Oral 4x Daily     Continuous Infusions:   sodium chloride      sodium chloride 12.5 mL/hr at 24 2352     PRN Meds:sodium chloride flush, sodium chloride, heparin flush, sodium chloride flush, sodium chloride, ondansetron **OR** ondansetron, oxyCODONE **OR** oxyCODONE    OBJECTIVE:  BP (!) 140/84   Pulse 88   Temp 97.7 °F (36.5 °C) (Oral)   Resp 17   Ht 1.803 m (5' 11\")   Wt 79.4 kg (175 lb)   SpO2 99%   BMI 24.41 kg/m²   Temp  Av.5 °F (36.4 °C)  Min: 97.3 °F (36.3 °C)  Max: 97.7 °F (36.5 °C)  Constitutional: The patient is awake, alert, and oriented. Sitting up in the chair, mom at bedside.   Skin: Warm and dry. No rashes were noted.   HEENT: Round and reactive pupils.  Moist mucous membranes.  No ulcerations or thrush.  Neck: Supple to movements.   Chest: No use of accessory muscles to breathe. Symmetrical expansion.  No wheezing, crackles or rhonchi.  Cardiovascular: S1 and S2 are rhythmic and regular. No murmurs appreciated.   Abdomen: Positive bowel sounds to auscultation. Minimal tenderness to palpation RLQ. No masses felt.   Extremities: No

## 2024-02-06 NOTE — PROGRESS NOTES
GENERAL SURGERY  DAILY PROGRESS NOTE  2/6/2024    CHIEF COMPLAINT:  Chief Complaint   Patient presents with    Abdominal Pain     Mid abd pain that radiates down to RLQ x 2 days, symptoms began Tuesday morning.    Diarrhea    Fever     100.8 F at home today        SUBJECTIVE:  PICC placed yesterday. Some cramping overnight. Tolerating diet, passing flatus, having BM.     OBJECTIVE:  /88   Pulse 88   Temp 97.3 °F (36.3 °C) (Oral)   Resp 16   Ht 1.803 m (5' 11\")   Wt 79.4 kg (175 lb)   SpO2 95%   BMI 24.41 kg/m²     GENERAL:  NAD. A&Ox3.  LUNGS:  No increased work of breathing.  CARDIOVASCULAR: RR  ABDOMEN:  Soft, non-distended, minimal tenderness to palpation RLQ. No guarding, rigidity, rebound.  EXT: warm and well perfused, normal ROM    ASSESSMENT/PLAN:  23 y.o. male with perforated appendicitis, planning for conservative management.     - continue regular diet  - PICC in place, IV abx reconciled for discharge, appreciate ID assistance and management  - OOB, ambulate  - prn pain control  - prn anti-emetics  - DC planning, likely dc today    Discussed with Dr. Salinas.     Michelle Bright MD  Surgery Resident PGY-3  2/6/2024  4:54 AM

## 2024-02-06 NOTE — PROGRESS NOTES
P Quality Flow/Interdisciplinary Rounds Progress Note        Quality Flow Rounds held on February 6, 2024    Disciplines Attending:  Bedside Nurse, , , and Nursing Unit Leadership    Sandeep Malave was admitted on 2/1/2024  1:24 AM    Anticipated Discharge Date:       Disposition:    Pavan Score:  Pavan Scale Score: 21    Readmission Risk              Risk of Unplanned Readmission:  8           Discussed patient goal for the day, patient clinical progression, and barriers to discharge.  The following Goal(s) of the Day/Commitment(s) have been identified:   Discharge planning      Kaitlyn Celestin RN  February 6, 2024

## 2024-02-07 NOTE — PROGRESS NOTES
SPIRITUAL HEALTH SERVICES - [facility] Encounter    Name: Sandeep Mendozaajit                  Referral: Routine Visit    Sacraments  Anointed (Last Rites): No  Apostolic [place]: No  Confession: No  Communion: No     Assessment:  Patient was already discharged      Intervention:  None.     Outcome:  None.    Plan:  None.      Electronically signed by Chaplain Danita on 2/6/2024 at 7:56 PM.  Spiritual Care Department  [facility]  [phone]

## 2024-02-08 LAB
MICROORGANISM SPEC CULT: NORMAL
MICROORGANISM SPEC CULT: NORMAL
SERVICE CMNT-IMP: NORMAL
SERVICE CMNT-IMP: NORMAL
SPECIMEN DESCRIPTION: NORMAL
SPECIMEN DESCRIPTION: NORMAL

## 2024-02-16 ENCOUNTER — HOSPITAL ENCOUNTER (OUTPATIENT)
Dept: CT IMAGING | Age: 24
Discharge: HOME | End: 2024-02-16
Attending: STUDENT IN AN ORGANIZED HEALTH CARE EDUCATION/TRAINING PROGRAM
Payer: COMMERCIAL

## 2024-02-16 DIAGNOSIS — K35.32 PERFORATED APPENDICITIS: ICD-10-CM

## 2024-02-16 PROCEDURE — 6360000004 HC RX CONTRAST MEDICATION: Performed by: RADIOLOGY

## 2024-02-16 PROCEDURE — 74177 CT ABD & PELVIS W/CONTRAST: CPT

## 2024-02-16 RX ADMIN — IOPAMIDOL 75 ML: 755 INJECTION, SOLUTION INTRAVENOUS at 13:10

## 2024-05-15 ENCOUNTER — HOSPITAL ENCOUNTER (OUTPATIENT)
Age: 24
Discharge: HOME | End: 2024-05-17

## 2024-05-15 PROCEDURE — 88304 TISSUE EXAM BY PATHOLOGIST: CPT

## 2024-05-17 LAB — SURGICAL PATHOLOGY REPORT: NORMAL
